# Patient Record
Sex: MALE | Race: BLACK OR AFRICAN AMERICAN | NOT HISPANIC OR LATINO | Employment: FULL TIME | URBAN - METROPOLITAN AREA
[De-identification: names, ages, dates, MRNs, and addresses within clinical notes are randomized per-mention and may not be internally consistent; named-entity substitution may affect disease eponyms.]

---

## 2017-08-18 ENCOUNTER — HOSPITAL ENCOUNTER (OUTPATIENT)
Facility: OTHER | Age: 47
Discharge: HOME | End: 2017-08-19
Attending: HOSPITALIST | Admitting: HOSPITALIST
Payer: MEDICAID

## 2017-08-18 DIAGNOSIS — R79.89 ELEVATED TROPONIN: Primary | ICD-10-CM

## 2017-08-18 DIAGNOSIS — R55 SYNCOPE, UNSPECIFIED SYNCOPE TYPE: ICD-10-CM

## 2017-08-18 DIAGNOSIS — R55 SYNCOPE: ICD-10-CM

## 2017-08-18 DIAGNOSIS — N17.9 ACUTE KIDNEY INJURY: ICD-10-CM

## 2017-08-18 DIAGNOSIS — E86.0 DEHYDRATION, MODERATE: ICD-10-CM

## 2017-08-18 DIAGNOSIS — I10 ESSENTIAL HYPERTENSION: ICD-10-CM

## 2017-08-18 PROBLEM — M62.82 RHABDOMYOLYSIS: Status: ACTIVE | Noted: 2017-08-18

## 2017-08-18 LAB
ALBUMIN SERPL BCP-MCNC: 3.7 G/DL
ALP SERPL-CCNC: 76 U/L
ALT SERPL W/O P-5'-P-CCNC: 18 U/L
ANION GAP SERPL CALC-SCNC: 11 MMOL/L
AST SERPL-CCNC: 25 U/L
BASOPHILS # BLD AUTO: 0.02 K/UL
BASOPHILS NFR BLD: 0.2 %
BILIRUB SERPL-MCNC: 0.7 MG/DL
BILIRUB UR QL STRIP: NEGATIVE
BUN SERPL-MCNC: 28 MG/DL
CALCIUM SERPL-MCNC: 9 MG/DL
CHLORIDE SERPL-SCNC: 104 MMOL/L
CHOLEST/HDLC SERPL: 3.7 {RATIO}
CK SERPL-CCNC: 551 U/L
CLARITY UR: CLEAR
CO2 SERPL-SCNC: 22 MMOL/L
COLOR UR: YELLOW
CREAT SERPL-MCNC: 2.4 MG/DL
DIFFERENTIAL METHOD: ABNORMAL
EOSINOPHIL # BLD AUTO: 0 K/UL
EOSINOPHIL NFR BLD: 0.3 %
ERYTHROCYTE [DISTWIDTH] IN BLOOD BY AUTOMATED COUNT: 13.7 %
EST. GFR  (AFRICAN AMERICAN): 35 ML/MIN/1.73 M^2
EST. GFR  (NON AFRICAN AMERICAN): 31 ML/MIN/1.73 M^2
ESTIMATED AVG GLUCOSE: 80 MG/DL
GLUCOSE SERPL-MCNC: 96 MG/DL
GLUCOSE UR QL STRIP: NEGATIVE
HBA1C MFR BLD HPLC: 4.4 %
HCT VFR BLD AUTO: 39.5 %
HDL/CHOLESTEROL RATIO: 27.3 %
HDLC SERPL-MCNC: 183 MG/DL
HDLC SERPL-MCNC: 50 MG/DL
HGB BLD-MCNC: 12.9 G/DL
HGB UR QL STRIP: ABNORMAL
INR PPP: 0.9
KETONES UR QL STRIP: NEGATIVE
LDLC SERPL CALC-MCNC: 114.6 MG/DL
LEUKOCYTE ESTERASE UR QL STRIP: NEGATIVE
LYMPHOCYTES # BLD AUTO: 2 K/UL
LYMPHOCYTES NFR BLD: 18.2 %
MCH RBC QN AUTO: 31.1 PG
MCHC RBC AUTO-ENTMCNC: 32.7 G/DL
MCV RBC AUTO: 95 FL
MICROSCOPIC COMMENT: ABNORMAL
MONOCYTES # BLD AUTO: 1.2 K/UL
MONOCYTES NFR BLD: 10.9 %
NEUTROPHILS # BLD AUTO: 7.5 K/UL
NEUTROPHILS NFR BLD: 70.1 %
NITRITE UR QL STRIP: NEGATIVE
NONHDLC SERPL-MCNC: 133 MG/DL
PH UR STRIP: 6 [PH] (ref 5–8)
PLATELET # BLD AUTO: 227 K/UL
PMV BLD AUTO: 12.6 FL
POTASSIUM SERPL-SCNC: 4.2 MMOL/L
PROT SERPL-MCNC: 7.4 G/DL
PROT UR QL STRIP: NEGATIVE
PROTHROMBIN TIME: 10.6 SEC
RBC # BLD AUTO: 4.15 M/UL
RBC #/AREA URNS HPF: 6 /HPF (ref 0–4)
SODIUM SERPL-SCNC: 137 MMOL/L
SP GR UR STRIP: 1.02 (ref 1–1.03)
SQUAMOUS #/AREA URNS HPF: 2 /HPF
TRIGL SERPL-MCNC: 92 MG/DL
TROPONIN I SERPL DL<=0.01 NG/ML-MCNC: 0.03 NG/ML
TROPONIN I SERPL DL<=0.01 NG/ML-MCNC: 0.04 NG/ML
TROPONIN I SERPL DL<=0.01 NG/ML-MCNC: 0.05 NG/ML
URN SPEC COLLECT METH UR: ABNORMAL
UROBILINOGEN UR STRIP-ACNC: NEGATIVE EU/DL
WBC # BLD AUTO: 10.72 K/UL

## 2017-08-18 PROCEDURE — G0378 HOSPITAL OBSERVATION PER HR: HCPCS

## 2017-08-18 PROCEDURE — 25000003 PHARM REV CODE 250: Performed by: INTERNAL MEDICINE

## 2017-08-18 PROCEDURE — 99220 PR INITIAL OBSERVATION CARE,LEVL III: CPT | Mod: ,,, | Performed by: HOSPITALIST

## 2017-08-18 PROCEDURE — 82550 ASSAY OF CK (CPK): CPT

## 2017-08-18 PROCEDURE — 84484 ASSAY OF TROPONIN QUANT: CPT | Mod: 91

## 2017-08-18 PROCEDURE — 25000003 PHARM REV CODE 250: Performed by: HOSPITALIST

## 2017-08-18 PROCEDURE — 81000 URINALYSIS NONAUTO W/SCOPE: CPT

## 2017-08-18 PROCEDURE — 36415 COLL VENOUS BLD VENIPUNCTURE: CPT

## 2017-08-18 PROCEDURE — 85025 COMPLETE CBC W/AUTO DIFF WBC: CPT

## 2017-08-18 PROCEDURE — 80061 LIPID PANEL: CPT

## 2017-08-18 PROCEDURE — G0379 DIRECT REFER HOSPITAL OBSERV: HCPCS

## 2017-08-18 PROCEDURE — 83036 HEMOGLOBIN GLYCOSYLATED A1C: CPT

## 2017-08-18 PROCEDURE — 85610 PROTHROMBIN TIME: CPT

## 2017-08-18 PROCEDURE — 80053 COMPREHEN METABOLIC PANEL: CPT

## 2017-08-18 PROCEDURE — 93306 TTE W/DOPPLER COMPLETE: CPT

## 2017-08-18 RX ORDER — LISINOPRIL 20 MG/1
20 TABLET ORAL DAILY
Status: ON HOLD | COMMUNITY
End: 2023-03-29 | Stop reason: SDUPTHER

## 2017-08-18 RX ORDER — NAPROXEN SODIUM 220 MG/1
81 TABLET, FILM COATED ORAL DAILY
Status: DISCONTINUED | OUTPATIENT
Start: 2017-08-18 | End: 2017-08-19 | Stop reason: HOSPADM

## 2017-08-18 RX ORDER — SODIUM CHLORIDE 9 MG/ML
INJECTION, SOLUTION INTRAVENOUS CONTINUOUS
Status: DISCONTINUED | OUTPATIENT
Start: 2017-08-18 | End: 2017-08-19 | Stop reason: HOSPADM

## 2017-08-18 RX ORDER — IBUPROFEN 200 MG
16 TABLET ORAL
Status: DISCONTINUED | OUTPATIENT
Start: 2017-08-18 | End: 2017-08-19 | Stop reason: HOSPADM

## 2017-08-18 RX ORDER — POTASSIUM CHLORIDE 20 MEQ/15ML
60 SOLUTION ORAL ONCE
Status: COMPLETED | OUTPATIENT
Start: 2017-08-18 | End: 2017-08-18

## 2017-08-18 RX ORDER — NIFEDIPINE 30 MG/1
30 TABLET, EXTENDED RELEASE ORAL DAILY
Status: DISCONTINUED | OUTPATIENT
Start: 2017-08-18 | End: 2017-08-19 | Stop reason: HOSPADM

## 2017-08-18 RX ORDER — GLUCAGON 1 MG
1 KIT INJECTION
Status: DISCONTINUED | OUTPATIENT
Start: 2017-08-18 | End: 2017-08-19 | Stop reason: HOSPADM

## 2017-08-18 RX ORDER — ATORVASTATIN CALCIUM 20 MG/1
40 TABLET, FILM COATED ORAL DAILY
Status: DISCONTINUED | OUTPATIENT
Start: 2017-08-18 | End: 2017-08-19 | Stop reason: HOSPADM

## 2017-08-18 RX ORDER — IBUPROFEN 200 MG
24 TABLET ORAL
Status: DISCONTINUED | OUTPATIENT
Start: 2017-08-18 | End: 2017-08-19 | Stop reason: HOSPADM

## 2017-08-18 RX ADMIN — SODIUM CHLORIDE: 0.9 INJECTION, SOLUTION INTRAVENOUS at 12:08

## 2017-08-18 RX ADMIN — POTASSIUM CHLORIDE 60 MEQ: 40 SOLUTION ORAL at 04:08

## 2017-08-18 RX ADMIN — ATORVASTATIN CALCIUM 40 MG: 20 TABLET, FILM COATED ORAL at 08:08

## 2017-08-18 RX ADMIN — SODIUM CHLORIDE: 0.9 INJECTION, SOLUTION INTRAVENOUS at 04:08

## 2017-08-18 RX ADMIN — NIFEDIPINE 30 MG: 30 TABLET, FILM COATED, EXTENDED RELEASE ORAL at 05:08

## 2017-08-18 RX ADMIN — SODIUM CHLORIDE: 0.9 INJECTION, SOLUTION INTRAVENOUS at 08:08

## 2017-08-18 RX ADMIN — ASPIRIN 81 MG CHEWABLE TABLET 81 MG: 81 TABLET CHEWABLE at 08:08

## 2017-08-18 NOTE — PLAN OF CARE
DC Planning:    Writer met with patient at bedside to discuss plan of care. Patient reports living with significant other, no needs expressed. Patient requested info on Pharmacy Assistance program. Writer added info on follow-up section on AVS. CM to follow and remain supportive.      08/18/17 1801   Discharge Assessment   Assessment Type Discharge Planning Assessment   Confirmed/corrected address and phone number on facesheet? Yes   Assessment information obtained from? Patient   Prior to hospitilization cognitive status: Alert/Oriented   Prior to hospitalization functional status: Independent   Current cognitive status: Alert/Oriented   Current Functional Status: Independent   Arrived From admitted as an inpatient   Lives With significant other   Able to Return to Prior Arrangements yes   Is patient able to care for self after discharge? Yes   How many people do you have in your home that can help with your care after discharge? 1   Who are your caregiver(s) and their phone number(s)? Tracee Mari S/O, [phone]   Patient currently being followed by outpatient case management? No   Patient currently receives home health services? No   Does the patient currently use HME? No   Patient currently receives private duty nursing? N/A   Patient currently receives any other outside agency services? No   Equipment Currently Used at Home none   Do you have any problems affording any of your prescribed medications? No   Is the patient taking medications as prescribed? yes   Do you have any financial concerns preventing you from receiving the healthcare you need? Yes  (states Lisinopril is $80 a refill and too expensive)   Does the patient have transportation to healthcare appointments? Yes   Transportation Available car;family or friend will provide   Discharge Plan A Home with family   Patient/Family In Agreement With Plan yes

## 2017-08-18 NOTE — SUBJECTIVE & OBJECTIVE
History reviewed. No pertinent past medical history.    Past Surgical History:   Procedure Laterality Date    HERNIA REPAIR         Review of patient's allergies indicates:  No Known Allergies    No current facility-administered medications on file prior to encounter.      No current outpatient prescriptions on file prior to encounter.     Family History     Problem Relation (Age of Onset)    Hypertension Mother        Social History Main Topics    Smoking status: Current Every Day Smoker     Packs/day: 1.00     Types: Cigarettes     Start date: 8/18/1989    Smokeless tobacco: Never Used    Alcohol use Yes      Comment: occasional    Drug use: No    Sexual activity: Not on file     Review of Systems   Constitutional: Negative for chills and fever.   HENT: Negative for rhinorrhea and sore throat.    Eyes: Negative for photophobia and visual disturbance.   Respiratory: Negative for cough and shortness of breath.    Cardiovascular: Negative for chest pain and palpitations.   Gastrointestinal: Negative for diarrhea and nausea.   Genitourinary: Negative for dysuria and frequency.   Musculoskeletal: Negative for back pain and gait problem.   Neurological: Positive for syncope and light-headedness.   Psychiatric/Behavioral: Negative for confusion and dysphoric mood.     Objective:     Vital Signs (Most Recent):  Temp: 98 °F (36.7 °C) (08/18/17 1500)  Pulse: 69 (08/18/17 1600)  Resp: 18 (08/18/17 0726)  BP: (!) 174/92 (08/18/17 1500)  SpO2: 96 % (08/18/17 1500) Vital Signs (24h Range):  Temp:  [98 °F (36.7 °C)-98.4 °F (36.9 °C)] 98 °F (36.7 °C)  Pulse:  [67-80] 69  Resp:  [16-18] 18  SpO2:  [96 %-98 %] 96 %  BP: (120-174)/(73-92) 174/92     Weight: 72.6 kg (160 lb)  Body mass index is 25.82 kg/m².    Physical Exam   Constitutional: He is oriented to person, place, and time. He appears well-developed and well-nourished.   HENT:   Head: Normocephalic.   Eyes: Conjunctivae are normal. Pupils are equal, round, and  reactive to light.   Neck: Neck supple. No thyromegaly present.   Cardiovascular: Normal rate, regular rhythm, normal heart sounds and intact distal pulses.  Exam reveals no gallop and no friction rub.    No murmur heard.  Pulmonary/Chest: Effort normal and breath sounds normal.   Abdominal: Soft. Bowel sounds are normal. He exhibits no distension. There is no tenderness.   Musculoskeletal: Normal range of motion. He exhibits no edema.   Lymphadenopathy:     He has no cervical adenopathy.   Neurological: He is alert and oriented to person, place, and time.   Strength equal and symmetric   Skin: Skin is warm and dry. No rash noted.   Psychiatric: He has a normal mood and affect. His behavior is normal. Thought content normal.        Significant Labs:   BMP:   Recent Labs  Lab 08/18/17  0425   GLU 96      K 4.2      CO2 22*   BUN 28*   CREATININE 2.4*   CALCIUM 9.0     CBC:   Recent Labs  Lab 08/18/17  0425   WBC 10.72   HGB 12.9*   HCT 39.5*          Significant Imaging: I have reviewed all pertinent imaging results/findings within the past 24 hours.

## 2017-08-18 NOTE — SUBJECTIVE & OBJECTIVE
History reviewed. No pertinent past medical history.    Past Surgical History:   Procedure Laterality Date    HERNIA REPAIR         Review of patient's allergies indicates:  No Known Allergies    No current facility-administered medications on file prior to encounter.      No current outpatient prescriptions on file prior to encounter.     Family History     Problem Relation (Age of Onset)    Hypertension Mother        Social History Main Topics    Smoking status: Current Every Day Smoker     Packs/day: 1.00     Types: Cigarettes     Start date: 8/18/1989    Smokeless tobacco: Never Used    Alcohol use Yes      Comment: occasional    Drug use: No    Sexual activity: Not on file     Review of Systems   Constitutional: Negative for activity change, appetite change and fever.   HENT: Negative.    Eyes: Negative.    Respiratory: Negative for chest tightness, shortness of breath and wheezing.    Cardiovascular: Negative for chest pain, palpitations and leg swelling.   Gastrointestinal: Negative for abdominal distention, abdominal pain, blood in stool, diarrhea and vomiting.   Genitourinary: Negative for dysuria and hematuria.   Neurological: Negative for headaches.   Hematological: Negative for adenopathy.   Psychiatric/Behavioral: Negative for confusion.     Objective:     Vital Signs (Most Recent):  Temp: 98.4 °F (36.9 °C) (08/18/17 0336)  Pulse: 74 (08/18/17 0336)  Resp: 16 (08/18/17 0336)  BP: 133/76 (08/18/17 0336)  SpO2: 97 % (08/18/17 0336) Vital Signs (24h Range):  Temp:  [98.4 °F (36.9 °C)] 98.4 °F (36.9 °C)  Pulse:  [74] 74  Resp:  [16] 16  SpO2:  [97 %] 97 %  BP: (133)/(76) 133/76     Weight: 72.6 kg (160 lb)  Body mass index is 25.82 kg/m².    Physical Exam   Constitutional: He is oriented to person, place, and time. He appears well-developed and well-nourished. No distress.   HENT:   Head: Normocephalic and atraumatic.   Eyes: Pupils are equal, round, and reactive to light.   Neck: Neck supple. No  thyromegaly present.   Cardiovascular: Normal rate and regular rhythm.  Exam reveals no gallop and no friction rub.    No murmur heard.  Pulmonary/Chest: Effort normal and breath sounds normal. No respiratory distress. He has no wheezes.   Abdominal: Soft. Bowel sounds are normal. He exhibits no distension. There is no tenderness. There is no guarding.   Musculoskeletal: Normal range of motion. He exhibits no edema.   Neurological: He is alert and oriented to person, place, and time.   Skin: Skin is warm and dry. No erythema.   Psychiatric: He has a normal mood and affect.        Significant Labs: CBC: No results for input(s): WBC, HGB, HCT, PLT in the last 48 hours.    Significant Imaging: I have reviewed all pertinent imaging results/findings within the past 24 hours.

## 2017-08-18 NOTE — ASSESSMENT & PLAN NOTE
- no chest pain or SOB  - ST depression in anterior leads in [place] but no comparison study  - Trend troponins  - Cardiologist following.

## 2017-08-18 NOTE — ASSESSMENT & PLAN NOTE
- no chest pain or SOB  - ST depression in anterior leads but no comparison study  - given 325 ASA  - cardiology consult  - will trend out troponins

## 2017-08-18 NOTE — CONSULTS
[facility]  Cardiology  Consult Note    Patient Name: Ye Sher  MRN: [id number]  Admission Date: 8/18/2017  Hospital Length of Stay: 0 days  Code Status: Full Code   Attending Provider: Erika Montero MD   Consulting Provider: Arianne Martin MD  Primary Care Physician: Timothy Stephens III, MD  Principal Problem:<principal problem not specified>    Patient information was obtained from patient and ER records.     Consults  Subjective:     Chief Complaint:  Syncope     HPI:   48 yo man who was diagnosed with hypertension in 2015. He has since been on lisinopril. When driving on 8/17/2017 he developed nausea and felt weak. He stopped the car and then vomited. He briefly passed out. He was seen in the ER at [facility]. Transfer[place] [facility] [place].    History reviewed. No pertinent past medical history.    Past Surgical History:   Procedure Laterality Date    HERNIA REPAIR         Review of patient's allergies indicates:  No Known Allergies    No current facility-administered medications on file prior to encounter.      No current outpatient prescriptions on file prior to encounter.     Family History     Problem Relation (Age of Onset)    Hypertension Mother        Social History Main Topics    Smoking status: Current Every Day Smoker     Packs/day: 1.00     Types: Cigarettes     Start date: 8/18/1989    Smokeless tobacco: Never Used    Alcohol use Yes      Comment: occasional    Drug use: No    Sexual activity: Not on file     Review of Systems   Constitution: Negative for chills, fever, weakness and malaise/fatigue.   HENT: Negative for headaches and nosebleeds.    Eyes: Negative for double vision, vision loss in left eye and vision loss in right eye.   Cardiovascular: Positive for syncope. Negative for chest pain, claudication, dyspnea on exertion, irregular heartbeat, leg swelling, near-syncope, orthopnea, palpitations and paroxysmal nocturnal dyspnea.   Respiratory: Negative  for cough, hemoptysis, shortness of breath and wheezing.    Endocrine: Negative for cold intolerance and heat intolerance.   Hematologic/Lymphatic: Negative for bleeding problem. Does not bruise/bleed easily.   Skin: Negative for color change and rash.   Musculoskeletal: Negative for back pain, falls, muscle weakness and myalgias.   Gastrointestinal: Positive for nausea and vomiting. Negative for heartburn, hematemesis, hematochezia, hemorrhoids, jaundice and melena.   Genitourinary: Negative for dysuria and hematuria.   Neurological: Negative for dizziness, focal weakness, light-headedness, loss of balance, numbness and vertigo.   Psychiatric/Behavioral: Negative for altered mental status, depression and memory loss. The patient is not nervous/anxious.    Allergic/Immunologic: Negative for hives and persistent infections.     Objective:     Vital Signs (Most Recent):  Temp: 98 °F (36.7 °C) (08/18/17 1500)  Pulse: 80 (08/18/17 1500)  Resp: 18 (08/18/17 0726)  BP: (!) 174/92 (08/18/17 1500)  SpO2: 96 % (08/18/17 1500) Vital Signs (24h Range):  Temp:  [98 °F (36.7 °C)-98.4 °F (36.9 °C)] 98 °F (36.7 °C)  Pulse:  [67-80] 80  Resp:  [16-18] 18  SpO2:  [96 %-98 %] 96 %  BP: (120-174)/(73-92) 174/92     Weight: 72.6 kg (160 lb)  Body mass index is 25.82 kg/m².    SpO2: 96 %  O2 Device (Oxygen Therapy): room air    No intake or output data in the 24 hours ending 08/18/17 1532    Lines/Drains/Airways          No matching active lines, drains, or airways          Physical Exam   Constitutional: He is oriented to person, place, and time. He appears well-developed and well-nourished.  Non-toxic appearance. No distress.   HENT:   Head: Normocephalic and atraumatic.   Nose: Nose normal.   Eyes: Right eye exhibits no discharge. Left eye exhibits no discharge. Right conjunctiva is not injected. Left conjunctiva is not injected. Right pupil is round. Left pupil is round. Pupils are equal.   Neck: Neck supple. No JVD present. Carotid  bruit is not present. No thyromegaly present.   Cardiovascular: Normal rate, regular rhythm, S1 normal and S2 normal.   No extrasystoles are present. PMI is not displaced.  Exam reveals gallop and S4. Exam reveals no S3.    Pulses:       Radial pulses are 2+ on the right side, and 2+ on the left side.        Femoral pulses are 2+ on the right side, and 2+ on the left side.       Dorsalis pedis pulses are 2+ on the right side, and 2+ on the left side.        Posterior tibial pulses are 2+ on the right side, and 2+ on the left side.   Pulmonary/Chest: Effort normal and breath sounds normal.   Abdominal: Soft. Normal appearance. There is no hepatosplenomegaly. There is no tenderness.   Musculoskeletal:        Right ankle: He exhibits no swelling, no ecchymosis and no deformity.        Left ankle: He exhibits no swelling, no ecchymosis and no deformity.   Lymphadenopathy:        Head (right side): No submandibular adenopathy present.        Head (left side): No submandibular adenopathy present.     He has no cervical adenopathy.   Neurological: He is alert and oriented to person, place, and time. He is not disoriented. No cranial nerve deficit.   Skin: Skin is warm, dry and intact. No rash noted. He is not diaphoretic. No cyanosis. No pallor. Nails show no clubbing.   Psychiatric: He has a normal mood and affect. His speech is normal and behavior is normal. Judgment and thought content normal. Cognition and memory are normal.       Current Medications:     aspirin  81 mg Oral Daily    atorvastatin  40 mg Oral Daily     Current Laboratory Results:    Recent Results (from the past 24 hour(s))   Troponin I    Collection Time: 08/18/17  4:25 AM   Result Value Ref Range    Troponin I 0.038 (H) 0.000 - 0.026 ng/mL   PT/INR    Collection Time: 08/18/17  4:25 AM   Result Value Ref Range    Prothrombin Time 10.6 9.0 - 12.5 sec    INR 0.9 0.8 - 1.2   Hemoglobin A1c    Collection Time: 08/18/17  4:25 AM   Result Value Ref Range     Hemoglobin A1C 4.4 4.0 - 5.6 %    Estimated Avg Glucose 80 68 - 131 mg/dL   Comprehensive Metabolic Panel (CMP)    Collection Time: 08/18/17  4:25 AM   Result Value Ref Range    Sodium 137 136 - 145 mmol/L    Potassium 4.2 3.5 - 5.1 mmol/L    Chloride 104 95 - 110 mmol/L    CO2 22 (L) 23 - 29 mmol/L    Glucose 96 70 - 110 mg/dL    BUN, Bld 28 (H) 6 - 20 mg/dL    Creatinine 2.4 (H) 0.5 - 1.4 mg/dL    Calcium 9.0 8.7 - 10.5 mg/dL    Total Protein 7.4 6.0 - 8.4 g/dL    Albumin 3.7 3.5 - 5.2 g/dL    Total Bilirubin 0.7 0.1 - 1.0 mg/dL    Alkaline Phosphatase 76 55 - 135 U/L    AST 25 10 - 40 U/L    ALT 18 10 - 44 U/L    Anion Gap 11 8 - 16 mmol/L    eGFR if African American 35 (A) >60 mL/min/1.73 m^2    eGFR if non African American 31 (A) >60 mL/min/1.73 m^2   CK    Collection Time: 08/18/17  4:25 AM   Result Value Ref Range     (H) 20 - 200 U/L   CBC with Automated Differential    Collection Time: 08/18/17  4:25 AM   Result Value Ref Range    WBC 10.72 3.90 - 12.70 K/uL    RBC 4.15 (L) 4.60 - 6.20 M/uL    Hemoglobin 12.9 (L) 14.0 - 18.0 g/dL    Hematocrit 39.5 (L) 40.0 - 54.0 %    MCV 95 82 - 98 fL    MCH 31.1 (H) 27.0 - 31.0 pg    MCHC 32.7 32.0 - 36.0 g/dL    RDW 13.7 11.5 - 14.5 %    Platelets 227 150 - 350 K/uL    MPV 12.6 9.2 - 12.9 fL    Gran # 7.5 1.8 - 7.7 K/uL    Lymph # 2.0 1.0 - 4.8 K/uL    Mono # 1.2 (H) 0.3 - 1.0 K/uL    Eos # 0.0 0.0 - 0.5 K/uL    Baso # 0.02 0.00 - 0.20 K/uL    Gran% 70.1 38.0 - 73.0 %    Lymph% 18.2 18.0 - 48.0 %    Mono% 10.9 4.0 - 15.0 %    Eosinophil% 0.3 0.0 - 8.0 %    Basophil% 0.2 0.0 - 1.9 %    Differential Method Automated    Lipid panel    Collection Time: 08/18/17  4:26 AM   Result Value Ref Range    Cholesterol 183 120 - 199 mg/dL    Triglycerides 92 30 - 150 mg/dL    HDL 50 40 - 75 mg/dL    LDL Cholesterol 114.6 63.0 - 159.0 mg/dL    HDL/Chol Ratio 27.3 20.0 - 50.0 %    Total Cholesterol/HDL Ratio 3.7 2.0 - 5.0    Non-HDL Cholesterol 133 mg/dL   Troponin I     Collection Time: 08/18/17 12:46 PM   Result Value Ref Range    Troponin I 0.032 (H) 0.000 - 0.026 ng/mL     Current Imaging Results:    Imaging Results    None       ECG: SR. LVH. Repolarization abnormality.    Assessment and Plan:     1. Syncope   Probably due to neurocardiogenic event with nausea and vomiting.   Suspect subtle troponin rise due to LVH and CKD.   Observe on telemetry.   Do Echo.    2. Hypertension   2015: Diagnosed. Probably longstanding.    3. Chronic Kidney Disease       VTE Risk Mitigation         Ordered     Medium Risk of VTE  Once      08/18/17 0324     Place sequential compression device  Until discontinued      08/18/17 0324     Place RAHUL hose  Until discontinued      08/18/17 0324          Thank you for your consult.    I will follow-up with patient. Please contact us if you have any additional questions.    Arianne Martin MD  Cardiology   [facility]

## 2017-08-18 NOTE — PROGRESS NOTES
[facility] Hospital Medicine  Progress Note    Patient Name: Ye Sher  MRN: [id number]  Patient Class: OP- Observation   Admission Date: 8/18/2017  Length of Stay: 0 days  Attending Physician: Ruben Colón MD  Primary Care Provider: Timothy Stephens III, MD        Subjective:     Principal Problem:<principal problem not specified>    HPI:  47 yea rold male with history of HTN who had a syncopal episode while driving with symptoms proceeding including light-headedness.  He was brought to [facility] and found at that time to have a mildly elevated troponin to 0.6 with ST depression in anterior leads on EKG.  At no time did the patient complain of shortness of breath or chest pain and he remained HD stable.  Of note, he works outside daily and feels his oral intake hasn't been adequate, and CK was >600.  He was transferred here for cardiology evaluation.  No other current complaints.     Hospital Course:  No notes on file    History reviewed. No pertinent past medical history.    Past Surgical History:   Procedure Laterality Date    HERNIA REPAIR         Review of patient's allergies indicates:  No Known Allergies    No current facility-administered medications on file prior to encounter.      No current outpatient prescriptions on file prior to encounter.     Family History     Problem Relation (Age of Onset)    Hypertension Mother        Social History Main Topics    Smoking status: Current Every Day Smoker     Packs/day: 1.00     Types: Cigarettes     Start date: 8/18/1989    Smokeless tobacco: Never Used    Alcohol use Yes      Comment: occasional    Drug use: No    Sexual activity: Not on file     Review of Systems   Constitutional: Negative for activity change, appetite change and fever.   HENT: Negative.    Eyes: Negative.    Respiratory: Negative for chest tightness, shortness of breath and wheezing.    Cardiovascular: Negative for chest pain, palpitations and  leg swelling.   Gastrointestinal: Negative for abdominal distention, abdominal pain, blood in stool, diarrhea and vomiting.   Genitourinary: Negative for dysuria and hematuria.   Neurological: Negative for headaches.   Hematological: Negative for adenopathy.   Psychiatric/Behavioral: Negative for confusion.     Objective:     Vital Signs (Most Recent):  Temp: 98.4 °F (36.9 °C) (08/18/17 0336)  Pulse: 74 (08/18/17 0336)  Resp: 16 (08/18/17 0336)  BP: 133/76 (08/18/17 0336)  SpO2: 97 % (08/18/17 0336) Vital Signs (24h Range):  Temp:  [98.4 °F (36.9 °C)] 98.4 °F (36.9 °C)  Pulse:  [74] 74  Resp:  [16] 16  SpO2:  [97 %] 97 %  BP: (133)/(76) 133/76     Weight: 72.6 kg (160 lb)  Body mass index is 25.82 kg/m².    Physical Exam   Constitutional: He is oriented to person, place, and time. He appears well-developed and well-nourished. No distress.   HENT:   Head: Normocephalic and atraumatic.   Eyes: Pupils are equal, round, and reactive to light.   Neck: Neck supple. No thyromegaly present.   Cardiovascular: Normal rate and regular rhythm.  Exam reveals no gallop and no friction rub.    No murmur heard.  Pulmonary/Chest: Effort normal and breath sounds normal. No respiratory distress. He has no wheezes.   Abdominal: Soft. Bowel sounds are normal. He exhibits no distension. There is no tenderness. There is no guarding.   Musculoskeletal: Normal range of motion. He exhibits no edema.   Neurological: He is alert and oriented to person, place, and time.   Skin: Skin is warm and dry. No erythema.   Psychiatric: He has a normal mood and affect.        Significant Labs: CBC: No results for input(s): WBC, HGB, HCT, PLT in the last 48 hours.    Significant Imaging: I have reviewed all pertinent imaging results/findings within the past 24 hours.    Assessment/Plan:      Essential hypertension    - Hold ACEI  - will monitor          Rhabdomyolysis    - CPK >600  - trend  - NS infusion          Elevated troponin    - no chest pain or  SOB  - ST depression in anterior leads but no comparison study  - given 325 ASA  - cardiology consult  - will trend out troponins          Syncope    - suspect neurocardiogenic given prodrome of symptoms  - possible demand ischemia responsible for elevated troponin?  - given fluids with improvement in symptoms  - continue to monitor  - cardiology consult            VTE Risk Mitigation         Ordered     Medium Risk of VTE  Once      08/18/17 0324     Place sequential compression device  Until discontinued      08/18/17 0324     Place RAHUL hose  Until discontinued      08/18/17 0324              Fred Fraire MD  Department of Hospital Medicine   [facility]

## 2017-08-18 NOTE — HOSPITAL COURSE
Patient was admitted on IV fluids for dehydration and acute kidney injury.  His lisinopril was held for CARY and he was started on nifedipine for blood pressure control.  Creatinine improved to 1.1 the following morning and his lisinopril was resumed.  He was seen by cardiology and thought to have neurocardiogenic syncope.  An outpatient stress test was recommended.  He will follow up with his PCP and with Dr. Martin as outpatient for a nuclear stress test.  Feeling well on discharge and looking forward to going home.

## 2017-08-18 NOTE — PLAN OF CARE
Problem: Patient Care Overview  Goal: Plan of Care Review  Outcome: Ongoing (interventions implemented as appropriate)  Plan of care reviewed with the pt. Pt resting comfortably in bed with no complaints at this time. Pt remains free from falls, injuries and skin breakdown. IV fluids infusing. Telemetry monitor maintained. Purposeful rounding performed. Bed in lowest position. Call light in reach. Will continue to monitor.

## 2017-08-18 NOTE — ASSESSMENT & PLAN NOTE
- CPK >600 on admission consistent with developing rhabdomyolysis, continue IV fluids and monitor for worsening renal function due to rhabdo/dehydration.  - Hold nephrotoxic meds  - Start nifedipine for hypertension, hold lisinopril.

## 2017-08-18 NOTE — ASSESSMENT & PLAN NOTE
- CARY associated with volume depletion  - Unknown baseline as most recent creatinine 1.4 in 2015.  - Continue close monitoring on IV fluids.

## 2017-08-18 NOTE — SUBJECTIVE & OBJECTIVE
History reviewed. No pertinent past medical history.    Past Surgical History:   Procedure Laterality Date    HERNIA REPAIR         Review of patient's allergies indicates:  No Known Allergies    No current facility-administered medications on file prior to encounter.      No current outpatient prescriptions on file prior to encounter.     Family History     Problem Relation (Age of Onset)    Hypertension Mother        Social History Main Topics    Smoking status: Current Every Day Smoker     Packs/day: 1.00     Types: Cigarettes     Start date: 8/18/1989    Smokeless tobacco: Never Used    Alcohol use Yes      Comment: occasional    Drug use: No    Sexual activity: Not on file     Review of Systems   Constitution: Negative for chills, fever, weakness and malaise/fatigue.   HENT: Negative for headaches and nosebleeds.    Eyes: Negative for double vision, vision loss in left eye and vision loss in right eye.   Cardiovascular: Positive for syncope. Negative for chest pain, claudication, dyspnea on exertion, irregular heartbeat, leg swelling, near-syncope, orthopnea, palpitations and paroxysmal nocturnal dyspnea.   Respiratory: Negative for cough, hemoptysis, shortness of breath and wheezing.    Endocrine: Negative for cold intolerance and heat intolerance.   Hematologic/Lymphatic: Negative for bleeding problem. Does not bruise/bleed easily.   Skin: Negative for color change and rash.   Musculoskeletal: Negative for back pain, falls, muscle weakness and myalgias.   Gastrointestinal: Positive for nausea and vomiting. Negative for heartburn, hematemesis, hematochezia, hemorrhoids, jaundice and melena.   Genitourinary: Negative for dysuria and hematuria.   Neurological: Negative for dizziness, focal weakness, light-headedness, loss of balance, numbness and vertigo.   Psychiatric/Behavioral: Negative for altered mental status, depression and memory loss. The patient is not nervous/anxious.    Allergic/Immunologic:  Negative for hives and persistent infections.     Objective:     Vital Signs (Most Recent):  Temp: 98 °F (36.7 °C) (08/18/17 1500)  Pulse: 80 (08/18/17 1500)  Resp: 18 (08/18/17 0726)  BP: (!) 174/92 (08/18/17 1500)  SpO2: 96 % (08/18/17 1500) Vital Signs (24h Range):  Temp:  [98 °F (36.7 °C)-98.4 °F (36.9 °C)] 98 °F (36.7 °C)  Pulse:  [67-80] 80  Resp:  [16-18] 18  SpO2:  [96 %-98 %] 96 %  BP: (120-174)/(73-92) 174/92     Weight: 72.6 kg (160 lb)  Body mass index is 25.82 kg/m².    SpO2: 96 %  O2 Device (Oxygen Therapy): room air    No intake or output data in the 24 hours ending 08/18/17 1532    Lines/Drains/Airways          No matching active lines, drains, or airways          Physical Exam   Constitutional: He is oriented to person, place, and time. He appears well-developed and well-nourished.  Non-toxic appearance. No distress.   HENT:   Head: Normocephalic and atraumatic.   Nose: Nose normal.   Eyes: Right eye exhibits no discharge. Left eye exhibits no discharge. Right conjunctiva is not injected. Left conjunctiva is not injected. Right pupil is round. Left pupil is round. Pupils are equal.   Neck: Neck supple. No JVD present. Carotid bruit is not present. No thyromegaly present.   Cardiovascular: Normal rate, regular rhythm, S1 normal and S2 normal.   No extrasystoles are present. PMI is not displaced.  Exam reveals gallop and S4. Exam reveals no S3.    Pulses:       Radial pulses are 2+ on the right side, and 2+ on the left side.        Femoral pulses are 2+ on the right side, and 2+ on the left side.       Dorsalis pedis pulses are 2+ on the right side, and 2+ on the left side.        Posterior tibial pulses are 2+ on the right side, and 2+ on the left side.   Pulmonary/Chest: Effort normal and breath sounds normal.   Abdominal: Soft. Normal appearance. There is no hepatosplenomegaly. There is no tenderness.   Musculoskeletal:        Right ankle: He exhibits no swelling, no ecchymosis and no deformity.         Left ankle: He exhibits no swelling, no ecchymosis and no deformity.   Lymphadenopathy:        Head (right side): No submandibular adenopathy present.        Head (left side): No submandibular adenopathy present.     He has no cervical adenopathy.   Neurological: He is alert and oriented to person, place, and time. He is not disoriented. No cranial nerve deficit.   Skin: Skin is warm, dry and intact. No rash noted. He is not diaphoretic. No cyanosis. No pallor. Nails show no clubbing.   Psychiatric: He has a normal mood and affect. His speech is normal and behavior is normal. Judgment and thought content normal. Cognition and memory are normal.       Current Medications:     aspirin  81 mg Oral Daily    atorvastatin  40 mg Oral Daily     Current Laboratory Results:    Recent Results (from the past 24 hour(s))   Troponin I    Collection Time: 08/18/17  4:25 AM   Result Value Ref Range    Troponin I 0.038 (H) 0.000 - 0.026 ng/mL   PT/INR    Collection Time: 08/18/17  4:25 AM   Result Value Ref Range    Prothrombin Time 10.6 9.0 - 12.5 sec    INR 0.9 0.8 - 1.2   Hemoglobin A1c    Collection Time: 08/18/17  4:25 AM   Result Value Ref Range    Hemoglobin A1C 4.4 4.0 - 5.6 %    Estimated Avg Glucose 80 68 - 131 mg/dL   Comprehensive Metabolic Panel (CMP)    Collection Time: 08/18/17  4:25 AM   Result Value Ref Range    Sodium 137 136 - 145 mmol/L    Potassium 4.2 3.5 - 5.1 mmol/L    Chloride 104 95 - 110 mmol/L    CO2 22 (L) 23 - 29 mmol/L    Glucose 96 70 - 110 mg/dL    BUN, Bld 28 (H) 6 - 20 mg/dL    Creatinine 2.4 (H) 0.5 - 1.4 mg/dL    Calcium 9.0 8.7 - 10.5 mg/dL    Total Protein 7.4 6.0 - 8.4 g/dL    Albumin 3.7 3.5 - 5.2 g/dL    Total Bilirubin 0.7 0.1 - 1.0 mg/dL    Alkaline Phosphatase 76 55 - 135 U/L    AST 25 10 - 40 U/L    ALT 18 10 - 44 U/L    Anion Gap 11 8 - 16 mmol/L    eGFR if African American 35 (A) >60 mL/min/1.73 m^2    eGFR if non African American 31 (A) >60 mL/min/1.73 m^2   CK    Collection  Time: 08/18/17  4:25 AM   Result Value Ref Range     (H) 20 - 200 U/L   CBC with Automated Differential    Collection Time: 08/18/17  4:25 AM   Result Value Ref Range    WBC 10.72 3.90 - 12.70 K/uL    RBC 4.15 (L) 4.60 - 6.20 M/uL    Hemoglobin 12.9 (L) 14.0 - 18.0 g/dL    Hematocrit 39.5 (L) 40.0 - 54.0 %    MCV 95 82 - 98 fL    MCH 31.1 (H) 27.0 - 31.0 pg    MCHC 32.7 32.0 - 36.0 g/dL    RDW 13.7 11.5 - 14.5 %    Platelets 227 150 - 350 K/uL    MPV 12.6 9.2 - 12.9 fL    Gran # 7.5 1.8 - 7.7 K/uL    Lymph # 2.0 1.0 - 4.8 K/uL    Mono # 1.2 (H) 0.3 - 1.0 K/uL    Eos # 0.0 0.0 - 0.5 K/uL    Baso # 0.02 0.00 - 0.20 K/uL    Gran% 70.1 38.0 - 73.0 %    Lymph% 18.2 18.0 - 48.0 %    Mono% 10.9 4.0 - 15.0 %    Eosinophil% 0.3 0.0 - 8.0 %    Basophil% 0.2 0.0 - 1.9 %    Differential Method Automated    Lipid panel    Collection Time: 08/18/17  4:26 AM   Result Value Ref Range    Cholesterol 183 120 - 199 mg/dL    Triglycerides 92 30 - 150 mg/dL    HDL 50 40 - 75 mg/dL    LDL Cholesterol 114.6 63.0 - 159.0 mg/dL    HDL/Chol Ratio 27.3 20.0 - 50.0 %    Total Cholesterol/HDL Ratio 3.7 2.0 - 5.0    Non-HDL Cholesterol 133 mg/dL   Troponin I    Collection Time: 08/18/17 12:46 PM   Result Value Ref Range    Troponin I 0.032 (H) 0.000 - 0.026 ng/mL     Current Imaging Results:    Imaging Results    None       ECG: SR. LVH. Repolarization abnormality.

## 2017-08-18 NOTE — ASSESSMENT & PLAN NOTE
- suspect neurocardiogenic given prodrome of symptoms  - possible demand ischemia responsible for elevated troponin?  - given fluids with improvement in symptoms  - continue to monitor  - cardiology consult

## 2017-08-18 NOTE — HPI
46 yo man who was diagnosed with hypertension in 2015. He has since been on lisinopril. When driving on 8/17/2017 he developed nausea and felt weak. He stopped the car and then vomited. He briefly passed out. He was seen in the ER at [facility]. Transferred to [facility] for his further.

## 2017-08-18 NOTE — HPI
Mr. Sher is a 47 year old man who works as a .  Yesterday while he was driving he became lightheaded and had to pull over and put his head on the wheel, then became nauseated and vomited.  EMS brought him to [facility] where he was found to have troponin I 0.6 with ST depression in the anterior leads on EKG, and he was transferred here for cardiac workup.  On arrival labs were significant for BUN/creatinine 28/2.4, , troponin 0.038 and mild anemia.  There was a creatinine of 1.4 for comparison from 2015 when he was seen at [facility] for a syncopal episode after a GI illness.    Patient's medical history is significant for hypertension for which he is on lisinopril.  He has had at least 4 previous episodes of syncope or near syncope, always associated with working outside in the heat.  He reports that he keeps water and Gatorade in his truck and tries to remember to drink.

## 2017-08-18 NOTE — H&P
[facility] Hospital Medicine  History & Physical    Patient Name: Ye Sher  MRN: [id number]  Admission Date: 8/18/2017  Attending Physician: Erika Montero MD   Primary Care Provider: Timothy Stephens III, MD         Patient information was obtained from patient, relative(s) and past medical records.     Subjective:     Principal Problem:Syncope    Chief Complaint: No chief complaint on file.       HPI: Mr. Sher is a 47 year old man who works as a .  Yesterday while he was driving he became lightheaded and had to pull over and put his head on the wheel, then became nauseated and vomited.  EMS brought him to [facility] where he was found to have troponin I 0.6 with ST depression in the anterior leads on EKG, and he was transferred here for cardiac workup.  On arrival labs were significant for BUN/creatinine 28/2.4, , troponin 0.038 and mild anemia.  There was a creatinine of 1.4 for comparison from 2015 when he was seen at [facility] for a syncopal episode after a GI illness.    Patient's medical history is significant for hypertension for which he is on lisinopril.  He has had at least 4 previous episodes of syncope or near syncope, always associated with working outside in the heat.  He reports that he keeps water and Gatorade in his truck and tries to remember to drink.    History reviewed. No pertinent past medical history.    Past Surgical History:   Procedure Laterality Date    HERNIA REPAIR         Review of patient's allergies indicates:  No Known Allergies    No current facility-administered medications on file prior to encounter.      No current outpatient prescriptions on file prior to encounter.     Family History     Problem Relation (Age of Onset)    Hypertension Mother        Social History Main Topics    Smoking status: Current Every Day Smoker     Packs/day: 1.00     Types: Cigarettes     Start date: 8/18/1989    Smokeless tobacco: Never  Used    Alcohol use Yes      Comment: occasional    Drug use: No    Sexual activity: Not on file     Review of Systems   Constitutional: Negative for chills and fever.   HENT: Negative for rhinorrhea and sore throat.    Eyes: Negative for photophobia and visual disturbance.   Respiratory: Negative for cough and shortness of breath.    Cardiovascular: Negative for chest pain and palpitations.   Gastrointestinal: Negative for diarrhea and nausea.   Genitourinary: Negative for dysuria and frequency.   Musculoskeletal: Negative for back pain and gait problem.   Neurological: Positive for syncope and light-headedness.   Psychiatric/Behavioral: Negative for confusion and dysphoric mood.     Objective:     Vital Signs (Most Recent):  Temp: 98 °F (36.7 °C) (08/18/17 1500)  Pulse: 69 (08/18/17 1600)  Resp: 18 (08/18/17 0726)  BP: (!) 174/92 (08/18/17 1500)  SpO2: 96 % (08/18/17 1500) Vital Signs (24h Range):  Temp:  [98 °F (36.7 °C)-98.4 °F (36.9 °C)] 98 °F (36.7 °C)  Pulse:  [67-80] 69  Resp:  [16-18] 18  SpO2:  [96 %-98 %] 96 %  BP: (120-174)/(73-92) 174/92     Weight: 72.6 kg (160 lb)  Body mass index is 25.82 kg/m².    Physical Exam   Constitutional: He is oriented to person, place, and time. He appears well-developed and well-nourished.   HENT:   Head: Normocephalic.   Eyes: Conjunctivae are normal. Pupils are equal, round, and reactive to light.   Neck: Neck supple. No thyromegaly present.   Cardiovascular: Normal rate, regular rhythm, normal heart sounds and intact distal pulses.  Exam reveals no gallop and no friction rub.    No murmur heard.  Pulmonary/Chest: Effort normal and breath sounds normal.   Abdominal: Soft. Bowel sounds are normal. He exhibits no distension. There is no tenderness.   Musculoskeletal: Normal range of motion. He exhibits no edema.   Lymphadenopathy:     He has no cervical adenopathy.   Neurological: He is alert and oriented to person, place, and time.   Strength equal and symmetric    Skin: Skin is warm and dry. No rash noted.   Psychiatric: He has a normal mood and affect. His behavior is normal. Thought content normal.        Significant Labs:   BMP:   Recent Labs  Lab 08/18/17 0425   GLU 96      K 4.2      CO2 22*   BUN 28*   CREATININE 2.4*   CALCIUM 9.0     CBC:   Recent Labs  Lab 08/18/17 0425   WBC 10.72   HGB 12.9*   HCT 39.5*          Significant Imaging: I have reviewed all pertinent imaging results/findings within the past 24 hours.    Assessment/Plan:     Dehydration, moderate    - CARY associated with volume depletion  - Unknown baseline as most recent creatinine 1.4 in 2015.  - Continue close monitoring on IV fluids.          Essential hypertension    - Ace held for CARY   - Start nifedipine given BP increasing on IVF.        Acute kidney injury    - CPK >600 on admission consistent with developing rhabdomyolysis, continue IV fluids and monitor for worsening renal function due to rhabdo/dehydration.  - Hold nephrotoxic meds  - Start nifedipine for hypertension, hold lisinopril.          Elevated troponin    - no chest pain or SOB  - ST depression in anterior leads in [place] but no comparison study  - Trend troponins  - Cardiologist following.        Syncope    - Likely neurocardiogenic syncope  - Patient with recurrent episodes associated with volume depletion  - possible demand ischemia responsible for elevated troponin  - 2D echo pending and cardiologist following.          VTE Risk Mitigation         Ordered     Medium Risk of VTE  Once      08/18/17 0324     Place sequential compression device  Until discontinued      08/18/17 0324     Place RAHUL hose  Until discontinued      08/18/17 0324             Erika Savage MD  Department of Hospital Medicine   [facility]

## 2017-08-18 NOTE — ASSESSMENT & PLAN NOTE
- Likely neurocardiogenic syncope  - Patient with recurrent episodes associated with volume depletion  - possible demand ischemia responsible for elevated troponin  - 2D echo pending and cardiologist following.

## 2017-08-19 VITALS
RESPIRATION RATE: 18 BRPM | HEIGHT: 66 IN | OXYGEN SATURATION: 98 % | HEART RATE: 81 BPM | WEIGHT: 160 LBS | TEMPERATURE: 99 F | SYSTOLIC BLOOD PRESSURE: 162 MMHG | BODY MASS INDEX: 25.71 KG/M2 | DIASTOLIC BLOOD PRESSURE: 88 MMHG

## 2017-08-19 LAB
ANION GAP SERPL CALC-SCNC: 7 MMOL/L
BASOPHILS # BLD AUTO: 0.02 K/UL
BASOPHILS NFR BLD: 0.2 %
BUN SERPL-MCNC: 17 MG/DL
CALCIUM SERPL-MCNC: 8.3 MG/DL
CHLORIDE SERPL-SCNC: 109 MMOL/L
CK SERPL-CCNC: 326 U/L
CO2 SERPL-SCNC: 21 MMOL/L
CREAT SERPL-MCNC: 1.1 MG/DL
DIASTOLIC DYSFUNCTION: YES
DIFFERENTIAL METHOD: ABNORMAL
EOSINOPHIL # BLD AUTO: 0.1 K/UL
EOSINOPHIL NFR BLD: 1 %
ERYTHROCYTE [DISTWIDTH] IN BLOOD BY AUTOMATED COUNT: 13.3 %
EST. GFR  (AFRICAN AMERICAN): >60 ML/MIN/1.73 M^2
EST. GFR  (NON AFRICAN AMERICAN): >60 ML/MIN/1.73 M^2
GLUCOSE SERPL-MCNC: 91 MG/DL
HCT VFR BLD AUTO: 37.4 %
HGB BLD-MCNC: 12.3 G/DL
LYMPHOCYTES # BLD AUTO: 2.4 K/UL
LYMPHOCYTES NFR BLD: 26.3 %
MAGNESIUM SERPL-MCNC: 1.7 MG/DL
MCH RBC QN AUTO: 31.4 PG
MCHC RBC AUTO-ENTMCNC: 32.9 G/DL
MCV RBC AUTO: 95 FL
MONOCYTES # BLD AUTO: 0.9 K/UL
MONOCYTES NFR BLD: 9.2 %
NEUTROPHILS # BLD AUTO: 5.8 K/UL
NEUTROPHILS NFR BLD: 63.1 %
PHOSPHATE SERPL-MCNC: 2.5 MG/DL
PLATELET # BLD AUTO: 185 K/UL
PMV BLD AUTO: 12.4 FL
POTASSIUM SERPL-SCNC: 4.2 MMOL/L
RBC # BLD AUTO: 3.92 M/UL
RETIRED EF AND QEF - SEE NOTES: 42 (ref 55–65)
SODIUM SERPL-SCNC: 137 MMOL/L
TROPONIN I SERPL DL<=0.01 NG/ML-MCNC: 0.03 NG/ML
WBC # BLD AUTO: 9.2 K/UL

## 2017-08-19 PROCEDURE — 25000003 PHARM REV CODE 250: Performed by: INTERNAL MEDICINE

## 2017-08-19 PROCEDURE — 85025 COMPLETE CBC W/AUTO DIFF WBC: CPT

## 2017-08-19 PROCEDURE — 80048 BASIC METABOLIC PNL TOTAL CA: CPT

## 2017-08-19 PROCEDURE — 82550 ASSAY OF CK (CPK): CPT

## 2017-08-19 PROCEDURE — 99225 PR SUBSEQUENT OBSERVATION CARE,LEVEL II: CPT | Mod: ,,, | Performed by: HOSPITALIST

## 2017-08-19 PROCEDURE — 25000003 PHARM REV CODE 250: Performed by: HOSPITALIST

## 2017-08-19 PROCEDURE — 84484 ASSAY OF TROPONIN QUANT: CPT

## 2017-08-19 PROCEDURE — 36415 COLL VENOUS BLD VENIPUNCTURE: CPT

## 2017-08-19 PROCEDURE — G0378 HOSPITAL OBSERVATION PER HR: HCPCS

## 2017-08-19 PROCEDURE — 83735 ASSAY OF MAGNESIUM: CPT

## 2017-08-19 PROCEDURE — 84100 ASSAY OF PHOSPHORUS: CPT

## 2017-08-19 RX ORDER — NIFEDIPINE 30 MG/1
30 TABLET, FILM COATED, EXTENDED RELEASE ORAL DAILY
Qty: 30 TABLET | Refills: 3
Start: 2017-08-19 | End: 2017-08-19 | Stop reason: HOSPADM

## 2017-08-19 RX ORDER — LISINOPRIL 20 MG/1
20 TABLET ORAL DAILY
Status: DISCONTINUED | OUTPATIENT
Start: 2017-08-19 | End: 2017-08-19 | Stop reason: HOSPADM

## 2017-08-19 RX ADMIN — LISINOPRIL 20 MG: 20 TABLET ORAL at 02:08

## 2017-08-19 RX ADMIN — ASPIRIN 81 MG CHEWABLE TABLET 81 MG: 81 TABLET CHEWABLE at 08:08

## 2017-08-19 RX ADMIN — SODIUM CHLORIDE: 0.9 INJECTION, SOLUTION INTRAVENOUS at 04:08

## 2017-08-19 RX ADMIN — NIFEDIPINE 30 MG: 30 TABLET, FILM COATED, EXTENDED RELEASE ORAL at 08:08

## 2017-08-19 RX ADMIN — ATORVASTATIN CALCIUM 40 MG: 20 TABLET, FILM COATED ORAL at 08:08

## 2017-08-19 NOTE — DISCHARGE SUMMARY
[facility] Hospital Medicine  Discharge Summary      Patient Name: Ye Sher  MRN: [id number]  Admission Date: 8/18/2017  Hospital Length of Stay: 0 days  Discharge Date and Time:  08/19/2017 2:43 PM  Attending Physician: Erika Montero MD   Discharging Provider: Erika Montero MD  Primary Care Provider: Timothy Stephens III, MD      HPI:   Mr. Sher is a 47 year old man who works as a .  Yesterday while he was driving he became lightheaded and had to pull over and put his head on the wheel, then became nauseated and vomited.  EMS brought him to [facility] where he was found to have troponin I 0.6 with ST depression in the anterior leads on EKG, and he was transferred here for cardiac workup.  On arrival labs were significant for BUN/creatinine 28/2.4, , troponin 0.038 and mild anemia.  There was a creatinine of 1.4 for comparison from 2015 when he was seen at [facility] for a syncopal episode after a GI illness.    Patient's medical history is significant for hypertension for which he is on lisinopril.  He has had at least 4 previous episodes of syncope or near syncope, always associated with working outside in the heat.  He reports that he keeps water and Gatorade in his truck and tries to remember to drink.    Hospital Course:   Patient was admitted on IV fluids for dehydration and acute kidney injury.  His lisinopril was held for CARY and he was started on nifedipine for blood pressure control.  Creatinine improved to 1.1 the following morning and his lisinopril was resumed.  He was seen by cardiology and thought to have neurocardiogenic syncope.  An outpatient stress test was recommended.  He will follow up with his PCP and with Dr. Martin as outpatient for a nuclear stress test.  Feeling well on discharge and looking forward to going home.     Consults:   Consults         Status Ordering Provider     Inpatient consult to Cardiology  Once      Provider:  Arianne Martin MD    Acknowledged KATHARINE GAMING            Final Active Diagnoses:    Diagnosis Date Noted POA    PRINCIPAL PROBLEM:  Syncope [R55] 08/18/2017 Yes    Elevated troponin [R74.8] 08/18/2017 Yes    Acute kidney injury [N17.9] 08/18/2017 Yes    Essential hypertension [I10] 08/18/2017 Yes    Dehydration, moderate [E86.0] 08/18/2017 Yes      Problems Resolved During this Admission:    Diagnosis Date Noted Date Resolved POA      No new Assessment & Plan notes have been filed under this hospital service since the last note was generated.  Service: Hospital Medicine      Discharged Condition: stable    Disposition: Home or Self Care    Follow Up:  Follow-up Information     Call [facility] Patient Pharmacy Assistance.    Why:  for assistance information regarding prescription costs  Contact information:  [phone]           Timothy Stephens III, MD.    Specialty:  Internal Medicine  Why:  Follow up in 1-2 weeks  Contact information:  [address]  [facility] [address]  [phone]             Arianne Martin MD.    Specialty:  Cardiology  Why:  Follow up for the next available appointment for a stress test  Contact information:  [address] [facility] [address]  [phone]                 Patient Instructions:     Diet general     Activity as tolerated       Medications:  Reconciled Home Medications:   Current Discharge Medication List      CONTINUE these medications which have NOT CHANGED    Details   lisinopril (PRINIVIL,ZESTRIL) 20 MG tablet Take 20 mg by mouth once daily.           Time spent on the discharge of patient: <30 minutes    HOS POC IP DISCHARGE SUMMARY    Erika Savage MD  Department of Hospital Medicine  [facility]

## 2017-08-19 NOTE — PROGRESS NOTES
Feels well. 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 When he blacked out, he says he was sitting in his air conditioned car, after a long day in the heat, sweating, when he blacked out.    Vitals:    08/19/17 0737 08/19/17 0800 08/19/17 1000 08/19/17 1202   BP: 133/80   (!) 162/88   BP Location: Left arm      Patient Position: Lying      Pulse: 73 80 69 68   Resp: 18   18   Temp: 98.7 °F (37.1 °C)   98.5 °F (36.9 °C)   TempSrc: Oral   Oral   SpO2: 97%   98%   Weight:       Height:           Chest clear  Cor: no murmur or gallop.    Labs reviewed. The Creat has normalized.    Rec: Resume Lisinopril  OK to discharge on Lisinopril  Will be seen by Dr Martin as OP, have a nuclear stress test performed.

## 2017-08-19 NOTE — NURSING
Discharge instructions and px given and verbalizes understanding.  Hep loc out and catheter intact.  No distress noted and anxious for discharge.

## 2017-09-23 ENCOUNTER — TELEPHONE (OUTPATIENT)
Dept: CARDIOLOGY | Facility: OTHER | Age: 47
End: 2017-09-23

## 2017-09-23 DIAGNOSIS — R79.89 ELEVATED TROPONIN: ICD-10-CM

## 2017-09-23 NOTE — TELEPHONE ENCOUNTER
9/21/2017: Stress MPI: 9:20 min. No CP. ECG LBBB with narrowing with exercise. MPI negative. Mild LV dysfunction.    I discussed the above test result and the implications of the findings over the phone.    Arianne Martin M.D.sfunction.

## 2018-04-06 ENCOUNTER — OFFICE VISIT (OUTPATIENT)
Dept: CARDIOLOGY | Facility: CLINIC | Age: 48
End: 2018-04-06
Payer: MEDICAID

## 2018-04-06 VITALS
DIASTOLIC BLOOD PRESSURE: 73 MMHG | BODY MASS INDEX: 27.21 KG/M2 | WEIGHT: 169.31 LBS | SYSTOLIC BLOOD PRESSURE: 123 MMHG | HEIGHT: 66 IN | HEART RATE: 83 BPM

## 2018-04-06 DIAGNOSIS — I45.6 WPW (WOLFF-PARKINSON-WHITE SYNDROME): ICD-10-CM

## 2018-04-06 DIAGNOSIS — R55 SYNCOPE, UNSPECIFIED SYNCOPE TYPE: ICD-10-CM

## 2018-04-06 PROCEDURE — 99999 PR PBB SHADOW E&M-EST. PATIENT-LVL III: CPT | Mod: PBBFAC,,, | Performed by: INTERNAL MEDICINE

## 2018-04-06 PROCEDURE — 99205 OFFICE O/P NEW HI 60 MIN: CPT | Mod: S$PBB,,, | Performed by: INTERNAL MEDICINE

## 2018-04-06 PROCEDURE — 99213 OFFICE O/P EST LOW 20 MIN: CPT | Mod: PBBFAC,PN | Performed by: INTERNAL MEDICINE

## 2018-04-06 NOTE — LETTER
April 6, 2018      [facility]  [address] [place] [address]           [facility]  [address], [facility] [address]  [place] [address]  Phone: [phone]  Fax: [phone]          Patient: Ye Sher   MR Number: [id number]   YOB: 1970   Date of Visit: 4/6/2018       Dear [facility]:    Thank you for referring Ye Sher to me for evaluation. Attached you will find relevant portions of my assessment and plan of care.    If you have questions, please do not hesitate to call me. I look forward to following Ye Sher along with you.    Sincerely,    Ladarius Kim MD    Enclosure  CC:  No Recipients    If you would like to receive this communication electronically, please contact [email or web address] or [phone] to request more information on [employer] access.    For providers and/or their staff who would like to refer a patient to [facility], please contact us through our one-stop-shop provider referral line, [facility] , at [phone].    If you feel you have received this communication in error or would no longer like to receive these types of communications, please e-mail [email or web address]

## 2018-04-07 NOTE — PROGRESS NOTES
"Subjective:    Patient ID:  Ye Sher is a 47 y.o. y.o. male who presents for follow-up Loss of Consciousness (ED follow up)      HPI     This is a 46 y/o on 3/20/18 reports having an episode tonight in which he felt hot, flushed and weak and then passed out for about 3-5 minutes.  His friends thought he was having a seizure because he twitched a few times when he lost consciousness.  Patient reports he felt nauseated and did have an episode of vomiting.  He has an episode of prior similar episodes periodically and was most recently seen about 6 mos ago for similar episode at which time he was admitted due to abnormal EKG, CARY and presumed dehydration with elevated troponin.  He has since had a stress test and reports he was told it was normal.  The patient reports prior to syncope, [place] and had very little to eat or drink, he finally got out and had 1 alcoholic drink and following ambulating to the bathroom reports the episode described above.         Review of Systems   Constitutional: Negative for malaise/fatigue and weight loss.   Eyes: Negative for blurred vision and double vision.   Respiratory: Negative for shortness of breath.    Cardiovascular: Negative for chest pain, palpitations, orthopnea, claudication, leg swelling and PND.   Gastrointestinal: Negative for abdominal pain, constipation, diarrhea, melena, nausea and vomiting.   Genitourinary: Negative for hematuria.   Musculoskeletal: Negative for myalgias.   Neurological: Negative for dizziness, weakness and headaches.   Endo/Heme/Allergies: Does not bruise/bleed easily.   Psychiatric/Behavioral: Negative for memory loss.        Objective:     /73 (BP Location: Left arm, Patient Position: Sitting, BP Method: Medium (Automatic))   Pulse 83   Ht 5' 6" (1.676 m)   Wt 76.8 kg (169 lb 4.8 oz)   BMI 27.33 kg/m²     Physical Exam   Constitutional: He is oriented to person, place, and time and well-developed, " well-nourished, and in no distress. He appears not jaundiced. No distress.   HENT:   Head: Normocephalic.   Eyes: Pupils are equal, round, and reactive to light. No scleral icterus.   Neck: No hepatojugular reflux and no JVD present. Carotid bruit is not present. No edema present.   Cardiovascular: Normal rate, regular rhythm, S1 normal, S2 normal, normal heart sounds and intact distal pulses.  Exam reveals no gallop, no S3, no S4 and no friction rub.    No murmur heard.  Pulses:       Radial pulses are 2+ on the right side, and 2+ on the left side.        Femoral pulses are 2+ on the right side, and 2+ on the left side.       Dorsalis pedis pulses are 2+ on the right side, and 2+ on the left side.        Posterior tibial pulses are 2+ on the right side, and 2+ on the left side.   Pulmonary/Chest: Breath sounds normal. No respiratory distress. He has no wheezes. He has no rales. He exhibits no tenderness.   Abdominal: Soft. Bowel sounds are normal. He exhibits no distension, no abdominal bruit and no mass. There is no hepatosplenomegaly.   Musculoskeletal: He exhibits no edema.   Neurological: He is alert and oriented to person, place, and time. Gait normal.   Skin: Skin is warm and dry. He is not diaphoretic.   Psychiatric: Affect normal.       Labs:     Lab Results   Component Value Date     (L) 03/20/2018    K 3.8 03/20/2018     03/20/2018    CO2 26 03/20/2018    BUN 23 03/20/2018    CREATININE 1.8 (H) 03/20/2018    ANIONGAP 6 (L) 03/20/2018     Lab Results   Component Value Date    HGBA1C 4.4 08/18/2017     No results found for: BNP, BNPTRIAGEBLO    Lab Results   Component Value Date    WBC 5.80 03/20/2018    HGB 12.0 (L) 03/20/2018    HCT 36.1 (L) 03/20/2018     03/20/2018    GRAN 2.8 03/20/2018    GRAN 48.4 03/20/2018     Lab Results   Component Value Date    CHOL 183 08/18/2017    HDL 50 08/18/2017    LDLCALC 114.6 08/18/2017    TRIG 92 08/18/2017       Meds:     Current Outpatient  Prescriptions:     lisinopril (PRINIVIL,ZESTRIL) 20 MG tablet, Take 20 mg by mouth once daily., Disp: , Rfl:       Assessment & Plan:     Syncope  Refer to EP.   ECG demonstrates WPW.    WPW (Brock-Parkinson-White syndrome)  Refer to EP  Patient refused transfer to [facility] on 3/20 after syncopal episode occurred. He now agrees to outpatient evaluation and treatment.

## 2018-04-07 NOTE — ASSESSMENT & PLAN NOTE
Refer to EP  Patient refused transfer to [facility] on 3/20 after syncopal episode occurred. He now agrees to outpatient evaluation and treatment.

## 2018-05-07 ENCOUNTER — TELEPHONE (OUTPATIENT)
Dept: ELECTROPHYSIOLOGY | Facility: CLINIC | Age: 48
End: 2018-05-07

## 2018-06-06 DIAGNOSIS — I45.6 WPW (WOLFF-PARKINSON-WHITE SYNDROME): Primary | ICD-10-CM

## 2018-06-13 ENCOUNTER — INITIAL CONSULT (OUTPATIENT)
Dept: ELECTROPHYSIOLOGY | Facility: CLINIC | Age: 48
End: 2018-06-13
Payer: MEDICAID

## 2018-06-13 ENCOUNTER — HOSPITAL ENCOUNTER (OUTPATIENT)
Dept: CARDIOLOGY | Facility: CLINIC | Age: 48
Discharge: HOME | End: 2018-06-13
Payer: MEDICAID

## 2018-06-13 VITALS
DIASTOLIC BLOOD PRESSURE: 62 MMHG | HEART RATE: 58 BPM | HEIGHT: 66 IN | WEIGHT: 164.44 LBS | SYSTOLIC BLOOD PRESSURE: 118 MMHG | BODY MASS INDEX: 26.43 KG/M2

## 2018-06-13 DIAGNOSIS — R55 SYNCOPE, UNSPECIFIED SYNCOPE TYPE: ICD-10-CM

## 2018-06-13 DIAGNOSIS — I45.6 WPW (WOLFF-PARKINSON-WHITE SYNDROME): Primary | ICD-10-CM

## 2018-06-13 DIAGNOSIS — I45.6 WPW (WOLFF-PARKINSON-WHITE SYNDROME): ICD-10-CM

## 2018-06-13 DIAGNOSIS — I10 ESSENTIAL HYPERTENSION: ICD-10-CM

## 2018-06-13 PROCEDURE — 93005 ELECTROCARDIOGRAM TRACING: CPT | Mod: PBBFAC | Performed by: INTERNAL MEDICINE

## 2018-06-13 PROCEDURE — 99213 OFFICE O/P EST LOW 20 MIN: CPT | Mod: PBBFAC,25 | Performed by: INTERNAL MEDICINE

## 2018-06-13 PROCEDURE — 93010 ELECTROCARDIOGRAM REPORT: CPT | Mod: S$PBB,,, | Performed by: INTERNAL MEDICINE

## 2018-06-13 PROCEDURE — 99204 OFFICE O/P NEW MOD 45 MIN: CPT | Mod: S$PBB,,, | Performed by: INTERNAL MEDICINE

## 2018-06-13 PROCEDURE — 99999 PR PBB SHADOW E&M-EST. PATIENT-LVL III: CPT | Mod: PBBFAC,,, | Performed by: INTERNAL MEDICINE

## 2018-06-13 NOTE — LETTER
June 13, 2018      Ladarius Kim MD  [address]  [facility] [address]           [facility] - Arrhythmia  [address]  [facility] [address]  Phone: [phone]  Fax: [phone]          Patient: Ye Sher   MR Number: [id number]   YOB: 1970   Date of Visit: 6/13/2018       Dear Dr. Ladarius Kim:    Thank you for referring Ye Sher to me for evaluation. Attached you will find relevant portions of my assessment and plan of care.    If you have questions, please do not hesitate to call me. I look forward to following Ye Sher along with you.    Sincerely,    Danny Quezada MD    Enclosure  CC:  No Recipients    If you would like to receive this communication electronically, please contact [email or web address] or [phone] to request more information on [employer] Link access.    For providers and/or their staff who would like to refer a patient to [facility], please contact us through our one-stop-shop provider referral line, [facility], at [phone].    If you feel you have received this communication in error or would no longer like to receive these types of communications, please e-mail [email or web address]

## 2018-06-13 NOTE — PROGRESS NOTES
"Subjective:    Patient ID:  Ye Sher is a 47 y.o. male who presents for evaluation of Loss of Consciousness      HPI 46 yo male with WPW, syncope, Htn.  Has had recurrent syncope.  Numerous in number ("over 20").  Started in his 20's.  Notes syncope.  Has diaphoresis, then acute of consciousness.  Witnessed to have seizure like activity.  Wakes up, sometimes notes palpitations.  Worse when not hydrated.  For instance, one day, he was at the [employer].  Had not eaten or drank all day.  Was at a meeting, was sitting, and had syncope.    One time he was admitted after an episode and was thought to be dehydrated.  Echo 8/18/17 normal biventricular structure and function.  Exercise spect 9/25/17 nsr.  Negative for ischemia.  Report indicates "loss of LBBB."  Strips reviewed.  There is gradual narrowing of the QRS.  I suspect this is related to enhanced AV lisandro conduction as opposed to loss of AP conduction.  ECG has revealed nsr with pre-excitation.  Suspect right sided AP, possibly anterior.  ECG today without pre-excitation.      Review of Systems   Constitution: Negative. Negative for weakness and malaise/fatigue.   Cardiovascular: Positive for syncope. Negative for chest pain, dyspnea on exertion, irregular heartbeat, leg swelling, near-syncope, orthopnea, palpitations and paroxysmal nocturnal dyspnea.   Respiratory: Negative for cough and shortness of breath.    Neurological: Negative for dizziness and light-headedness.   All other systems reviewed and are negative.       Objective:    Physical Exam   Constitutional: He is oriented to person, place, and time. He appears well-developed and well-nourished.   Eyes: Conjunctivae are normal. No scleral icterus.   Neck: No JVD present. No tracheal deviation present.   Cardiovascular: Normal rate, regular rhythm and normal heart sounds.  PMI is not displaced.    Pulmonary/Chest: Effort normal and breath sounds normal. No respiratory distress.   Abdominal: Soft. " There is no hepatosplenomegaly. There is no tenderness.   Musculoskeletal: He exhibits no edema (lower extremity) or tenderness.   Neurological: He is alert and oriented to person, place, and time.   Skin: Skin is warm and dry. No rash noted.   Psychiatric: He has a normal mood and affect. His behavior is normal.         Assessment:       1. WPW (Brock-Parkinson-White syndrome)    2. Syncope, unspecified syncope type    3. Essential hypertension         Plan:       WPW.  Intermittent pre-excitation.  This means he is at low risk for rapid antegrade conduction of AF, and thus at low risk of sudden death.  He has syncope.  History raises the possibility of dehydration, but SVT is also a possibility.  Offered continuing as is vs EPS/RFA.  He prefers EPS and possible RFA.  Discussed risks and benefits, including higher risk of AV block and PPM if this is anteroseptal.  He would like to proceed.  Anesthesia (prefer MAC).

## 2018-06-15 ENCOUNTER — TELEPHONE (OUTPATIENT)
Dept: ELECTROPHYSIOLOGY | Facility: CLINIC | Age: 48
End: 2018-06-15

## 2018-06-15 DIAGNOSIS — I45.6 WPW (WOLFF-PARKINSON-WHITE SYNDROME): Primary | ICD-10-CM

## 2018-06-15 NOTE — TELEPHONE ENCOUNTER
----- Message from Brittany Franklin sent at 6/15/2018  9:21 AM CDT -----  Contact: pt wife/Everette  Please call pt wife at [phone]. Patient wife has questions regarding future procedure with Dr Quezada    Thank you

## 2018-06-15 NOTE — TELEPHONE ENCOUNTER
Spoke with patient's wife. She wanted to know if he could take his work physical prior to WPW RFA. Advised that they will likely not want to do the physical until after the procedure. She verbalizes understanding.

## 2018-06-25 ENCOUNTER — TELEPHONE (OUTPATIENT)
Dept: ELECTROPHYSIOLOGY | Facility: CLINIC | Age: 48
End: 2018-06-25

## 2018-06-25 NOTE — TELEPHONE ENCOUNTER
ABLATION EDUCATION CHECKLIST    PRE-PROCEDURE TESTIN/13/18 @ 9 AM  Pre-Procedure labs have been ordered for you at:  [facility]  · Be sure to arrive at your scheduled time. YOU DO NOT HAVE TO FAST FOR THIS LAB WORK!    DAY OF PROCEDURE:    18 @ 9 AM  Report to Cardiology Waiting Room on 3rd floor of the Hospital    · Do not eat or drink anything after: 12 mn on the night before your procedure  · Please do not wear makeup (especially mascara) when arriving for your procedure    Medications:   · You may take your other usual morning medications with a sip of water      Directions to the Cardiology Waiting Room  If you park in the Parking Garage:  Take elevators to the 2nd floor  Walk up ramp and turn right by Gold Elevators  Take elevator to the 3rd floor  Upon exiting the elevator, turn away from the clinic areas  Walk long jay around to front of hospital to area with windows overlooking [facility]  Check in at Reception Desk  OR  If family is dropping you off:  Have them drop you off at the front of the Hospital  (Near the ER, where all the flags are hung).  Take the E elevators to the 3rd floor.  Check in at the Reception Desk in the waiting room.        · You will be spending the night after your procedure.  · You will need someone to drive you home the day after your procedure.  · Your pain during your procedure will be managed by the anesthesia team.     Any need to reschedule or cancel procedures, or any questions regarding your procedures should be addressed directly with the Arrhythmia Department Nurses at the following phone number: [phone]

## 2018-07-13 ENCOUNTER — LAB VISIT (OUTPATIENT)
Dept: LAB | Facility: HOSPITAL | Age: 48
End: 2018-07-13
Attending: INTERNAL MEDICINE
Payer: MEDICAID

## 2018-07-13 DIAGNOSIS — I45.6 WPW (WOLFF-PARKINSON-WHITE SYNDROME): ICD-10-CM

## 2018-07-13 LAB
ANION GAP SERPL CALC-SCNC: 6 MMOL/L
APTT BLDCRRT: 27.9 SEC
BASOPHILS # BLD AUTO: 0.03 K/UL
BASOPHILS NFR BLD: 0.5 %
BUN SERPL-MCNC: 24 MG/DL
CALCIUM SERPL-MCNC: 9.5 MG/DL
CHLORIDE SERPL-SCNC: 105 MMOL/L
CO2 SERPL-SCNC: 26 MMOL/L
CREAT SERPL-MCNC: 1.7 MG/DL
DIFFERENTIAL METHOD: ABNORMAL
EOSINOPHIL # BLD AUTO: 0.1 K/UL
EOSINOPHIL NFR BLD: 1.9 %
ERYTHROCYTE [DISTWIDTH] IN BLOOD BY AUTOMATED COUNT: 12.9 %
EST. GFR  (AFRICAN AMERICAN): 53 ML/MIN/1.73 M^2
EST. GFR  (NON AFRICAN AMERICAN): 46 ML/MIN/1.73 M^2
GLUCOSE SERPL-MCNC: 92 MG/DL
HCT VFR BLD AUTO: 34.8 %
HGB BLD-MCNC: 11.6 G/DL
INR PPP: 0.9
LYMPHOCYTES # BLD AUTO: 2.6 K/UL
LYMPHOCYTES NFR BLD: 42.4 %
MCH RBC QN AUTO: 31 PG
MCHC RBC AUTO-ENTMCNC: 33.3 G/DL
MCV RBC AUTO: 93 FL
MONOCYTES # BLD AUTO: 0.5 K/UL
MONOCYTES NFR BLD: 8.5 %
NEUTROPHILS # BLD AUTO: 2.9 K/UL
NEUTROPHILS NFR BLD: 46.5 %
PLATELET # BLD AUTO: 279 K/UL
PMV BLD AUTO: 10.3 FL
POTASSIUM SERPL-SCNC: 5 MMOL/L
PROTHROMBIN TIME: 9.7 SEC
RBC # BLD AUTO: 3.74 M/UL
SODIUM SERPL-SCNC: 137 MMOL/L
WBC # BLD AUTO: 6.21 K/UL

## 2018-07-13 PROCEDURE — 85610 PROTHROMBIN TIME: CPT

## 2018-07-13 PROCEDURE — 80048 BASIC METABOLIC PNL TOTAL CA: CPT

## 2018-07-13 PROCEDURE — 85730 THROMBOPLASTIN TIME PARTIAL: CPT

## 2018-07-13 PROCEDURE — 85025 COMPLETE CBC W/AUTO DIFF WBC: CPT

## 2018-07-13 PROCEDURE — 36415 COLL VENOUS BLD VENIPUNCTURE: CPT

## 2018-07-18 ENCOUNTER — TELEPHONE (OUTPATIENT)
Dept: ELECTROPHYSIOLOGY | Facility: CLINIC | Age: 48
End: 2018-07-18

## 2018-07-18 NOTE — TELEPHONE ENCOUNTER
Spoke to Mr. Sher       CONFIRMED tomorrow's procedure arrival time of 0900  Reiterated instructions including:  -Directions to check in desk  -NPO after midnight    Reviewed lab work (H&H and Creatine) with kathy Johnson to proceed with procedure as planned.     Pt verbalizes understanding of above and appreciates call.

## 2018-07-19 ENCOUNTER — ANESTHESIA (OUTPATIENT)
Dept: MEDSURG UNIT | Facility: HOSPITAL | Age: 48
End: 2018-07-19
Payer: MEDICAID

## 2018-07-19 ENCOUNTER — HOSPITAL ENCOUNTER (OUTPATIENT)
Facility: HOSPITAL | Age: 48
Discharge: HOME | End: 2018-07-20
Attending: INTERNAL MEDICINE | Admitting: INTERNAL MEDICINE
Payer: MEDICAID

## 2018-07-19 ENCOUNTER — SURGERY (OUTPATIENT)
Age: 48
End: 2018-07-19

## 2018-07-19 ENCOUNTER — ANESTHESIA EVENT (OUTPATIENT)
Dept: MEDSURG UNIT | Facility: HOSPITAL | Age: 48
End: 2018-07-19
Payer: MEDICAID

## 2018-07-19 DIAGNOSIS — R55 SYNCOPE AND COLLAPSE: ICD-10-CM

## 2018-07-19 DIAGNOSIS — I49.9 ABNORMAL HEART RHYTHM: ICD-10-CM

## 2018-07-19 DIAGNOSIS — I45.6 WPW (WOLFF-PARKINSON-WHITE SYNDROME): Primary | ICD-10-CM

## 2018-07-19 PROCEDURE — 00537 ANES CARDIAC EP PROCEDURES: CPT | Performed by: INTERNAL MEDICINE

## 2018-07-19 PROCEDURE — D9220A PRA ANESTHESIA: Mod: ANES,,, | Performed by: ANESTHESIOLOGY

## 2018-07-19 PROCEDURE — 93005 ELECTROCARDIOGRAM TRACING: CPT | Mod: 59

## 2018-07-19 PROCEDURE — 93621 COMP EP EVL L PAC&REC C SINS: CPT | Mod: 26,,, | Performed by: INTERNAL MEDICINE

## 2018-07-19 PROCEDURE — 94761 N-INVAS EAR/PLS OXIMETRY MLT: CPT

## 2018-07-19 PROCEDURE — 93613 INTRACARDIAC EPHYS 3D MAPG: CPT | Mod: ,,, | Performed by: INTERNAL MEDICINE

## 2018-07-19 PROCEDURE — 37000008 HC ANESTHESIA 1ST 15 MINUTES: Performed by: INTERNAL MEDICINE

## 2018-07-19 PROCEDURE — 93010 ELECTROCARDIOGRAM REPORT: CPT | Mod: ,,, | Performed by: INTERNAL MEDICINE

## 2018-07-19 PROCEDURE — 93621 COMP EP EVL L PAC&REC C SINS: CPT

## 2018-07-19 PROCEDURE — 93653 COMPRE EP EVAL TX SVT: CPT | Mod: ,,, | Performed by: INTERNAL MEDICINE

## 2018-07-19 PROCEDURE — D9220A PRA ANESTHESIA: Mod: CRNA,,, | Performed by: NURSE ANESTHETIST, CERTIFIED REGISTERED

## 2018-07-19 PROCEDURE — 63600175 PHARM REV CODE 636 W HCPCS: Performed by: NURSE ANESTHETIST, CERTIFIED REGISTERED

## 2018-07-19 PROCEDURE — C1731 CATH, EP, 20 OR MORE ELEC: HCPCS

## 2018-07-19 PROCEDURE — 25000003 PHARM REV CODE 250

## 2018-07-19 PROCEDURE — 25000003 PHARM REV CODE 250: Performed by: NURSE PRACTITIONER

## 2018-07-19 PROCEDURE — 25000003 PHARM REV CODE 250: Performed by: NURSE ANESTHETIST, CERTIFIED REGISTERED

## 2018-07-19 PROCEDURE — 93623 PRGRMD STIMJ&PACG IV RX NFS: CPT | Mod: 26,,, | Performed by: INTERNAL MEDICINE

## 2018-07-19 PROCEDURE — 37000009 HC ANESTHESIA EA ADD 15 MINS: Performed by: INTERNAL MEDICINE

## 2018-07-19 RX ORDER — FENTANYL CITRATE 50 UG/ML
INJECTION, SOLUTION INTRAMUSCULAR; INTRAVENOUS
Status: DISCONTINUED | OUTPATIENT
Start: 2018-07-19 | End: 2018-07-19

## 2018-07-19 RX ORDER — PHENYLEPHRINE HYDROCHLORIDE 10 MG/ML
INJECTION INTRAVENOUS
Status: DISCONTINUED | OUTPATIENT
Start: 2018-07-19 | End: 2018-07-19

## 2018-07-19 RX ORDER — KETAMINE HCL IN 0.9 % NACL 50 MG/5 ML
SYRINGE (ML) INTRAVENOUS
Status: DISCONTINUED | OUTPATIENT
Start: 2018-07-19 | End: 2018-07-19

## 2018-07-19 RX ORDER — FENTANYL CITRATE 50 UG/ML
25 INJECTION, SOLUTION INTRAMUSCULAR; INTRAVENOUS EVERY 5 MIN PRN
Status: DISCONTINUED | OUTPATIENT
Start: 2018-07-19 | End: 2018-07-20 | Stop reason: HOSPADM

## 2018-07-19 RX ORDER — PROPOFOL 10 MG/ML
VIAL (ML) INTRAVENOUS CONTINUOUS PRN
Status: DISCONTINUED | OUTPATIENT
Start: 2018-07-19 | End: 2018-07-19

## 2018-07-19 RX ORDER — MIDAZOLAM HYDROCHLORIDE 1 MG/ML
INJECTION, SOLUTION INTRAMUSCULAR; INTRAVENOUS
Status: DISCONTINUED | OUTPATIENT
Start: 2018-07-19 | End: 2018-07-19

## 2018-07-19 RX ORDER — DIPHENHYDRAMINE HYDROCHLORIDE 50 MG/ML
25 INJECTION INTRAMUSCULAR; INTRAVENOUS EVERY 6 HOURS PRN
Status: DISCONTINUED | OUTPATIENT
Start: 2018-07-19 | End: 2018-07-20 | Stop reason: HOSPADM

## 2018-07-19 RX ORDER — SODIUM CHLORIDE 9 MG/ML
INJECTION, SOLUTION INTRAVENOUS CONTINUOUS
Status: DISCONTINUED | OUTPATIENT
Start: 2018-07-19 | End: 2018-07-20 | Stop reason: HOSPADM

## 2018-07-19 RX ORDER — PROPOFOL 10 MG/ML
VIAL (ML) INTRAVENOUS
Status: DISCONTINUED | OUTPATIENT
Start: 2018-07-19 | End: 2018-07-19

## 2018-07-19 RX ORDER — LISINOPRIL 20 MG/1
20 TABLET ORAL DAILY
Status: DISCONTINUED | OUTPATIENT
Start: 2018-07-20 | End: 2018-07-20 | Stop reason: HOSPADM

## 2018-07-19 RX ADMIN — PHENYLEPHRINE HYDROCHLORIDE 100 MCG: 10 INJECTION INTRAVENOUS at 02:07

## 2018-07-19 RX ADMIN — ISOPROTERENOL HYDROCHLORIDE 2 MCG/MIN: 0.2 INJECTION, SOLUTION INTRAMUSCULAR; INTRAVENOUS at 02:07

## 2018-07-19 RX ADMIN — PROPOFOL 75 MCG/KG/MIN: 10 INJECTION, EMULSION INTRAVENOUS at 01:07

## 2018-07-19 RX ADMIN — FENTANYL CITRATE 25 MCG: 50 INJECTION, SOLUTION INTRAMUSCULAR; INTRAVENOUS at 03:07

## 2018-07-19 RX ADMIN — MIDAZOLAM 2 MG: 1 INJECTION INTRAMUSCULAR; INTRAVENOUS at 01:07

## 2018-07-19 RX ADMIN — Medication 25 MG: at 02:07

## 2018-07-19 RX ADMIN — SODIUM CHLORIDE: 0.9 INJECTION, SOLUTION INTRAVENOUS at 04:07

## 2018-07-19 RX ADMIN — Medication 15 MG: at 03:07

## 2018-07-19 RX ADMIN — Medication 10 MG: at 03:07

## 2018-07-19 RX ADMIN — FENTANYL CITRATE 50 MCG: 50 INJECTION, SOLUTION INTRAMUSCULAR; INTRAVENOUS at 01:07

## 2018-07-19 RX ADMIN — PROPOFOL 30 MG: 10 INJECTION, EMULSION INTRAVENOUS at 01:07

## 2018-07-19 RX ADMIN — SODIUM CHLORIDE: 0.9 INJECTION, SOLUTION INTRAVENOUS at 01:07

## 2018-07-19 NOTE — PROGRESS NOTES
Dr green ep fellow notified pt coughed upon arrival to pacu #16.  Left groin noted with sang drainage.  Pressure held for about 10mins.  Old drsg removed by pacu rn.  Not bleeding noted after pressure held. New drsg placed by pacu rn.  [place]ol[place]/trans film.  md ok with drsg change.  Per ep fellow continue to check groin checks [id number] x 1 hr then continue with orders.  Pt educated to notify rn ahead if he feels cough come on, so rn can hold pressure.  Pt also educated on where to press down over groin sites/drsg if he coughs and rn not at bedside. Verbalizes ok.  Will continue to monitor freq as well.

## 2018-07-19 NOTE — TRANSFER OF CARE
"Anesthesia Transfer of Care Note    Patient: Ye Sher    Procedure(s) Performed: Procedure(s) (LRB):  ABLATION (N/A)    Patient location: PACU    Anesthesia Type: general    Transport from OR: Transported from OR on 6-10 L/min O2 by face mask with adequate spontaneous ventilation    Post pain: adequate analgesia    Post assessment: no apparent anesthetic complications and tolerated procedure well    Post vital signs: stable    Level of consciousness: awake and responds to stimulation    Nausea/Vomiting: no nausea/vomiting    Complications: none    Transfer of care protocol was followed      Last vitals:   Visit Vitals  /62   Pulse 77   Temp 36.5 °C (97.7 °F) (Temporal)   Resp 20   Ht 5' 6" (1.676 m)   Wt 79.4 kg (175 lb)   SpO2 100%   BMI 28.25 kg/m²     "

## 2018-07-19 NOTE — NURSING TRANSFER
Nursing Transfer Note      7/19/2018     Transfer To: pacu 16 to sscu 314.    Transfer via stretcher    Transfer with cardiac monitoring. Tele box on 314    Transported by iman lamb    Medicines sent: none    Chart send with patient: Yes    Notified: spouse    Patient reassessed at: 7/19/18 1815. Next 30 mins checks at 1845.     Upon arrival to floor: cardiac monitor applied, patient oriented to room, call bell in reach and bed in lowest position

## 2018-07-19 NOTE — SUBJECTIVE & OBJECTIVE
Past Medical History:   Diagnosis Date    Hypertension        Past Surgical History:   Procedure Laterality Date    HERNIA REPAIR         Review of patient's allergies indicates:  No Known Allergies    No current facility-administered medications on file prior to encounter.      Current Outpatient Prescriptions on File Prior to Encounter   Medication Sig    lisinopril (PRINIVIL,ZESTRIL) 20 MG tablet Take 20 mg by mouth once daily.     Family History     Problem Relation (Age of Onset)    Hypertension Mother        Social History Main Topics    Smoking status: Current Every Day Smoker     Packs/day: 1.00     Types: Cigarettes     Start date: 8/18/1989    Smokeless tobacco: Never Used    Alcohol use Yes      Comment: occasional    Drug use: No    Sexual activity: Not on file     Review of Systems   All other systems reviewed and are negative.    Objective:     Vital Signs (Most Recent):  Temp: 98.1 °F (36.7 °C) (07/19/18 0846)  Pulse: 69 (07/19/18 0846)  Resp: 18 (07/19/18 0846)  BP: 124/76 (07/19/18 0847)  SpO2: 97 % (07/19/18 0846) Vital Signs (24h Range):  Temp:  [98.1 °F (36.7 °C)] 98.1 °F (36.7 °C)  Pulse:  [69] 69  Resp:  [18] 18  SpO2:  [97 %] 97 %  BP: (124-131)/(76) 124/76       Weight: 79.4 kg (175 lb)  Body mass index is 28.25 kg/m².    SpO2: 97 %  O2 Device (Oxygen Therapy): room air    Physical Exam   Constitutional: He is oriented to person, place, and time. He appears well-developed and well-nourished.   HENT:   Head: Normocephalic and atraumatic.   Eyes: No scleral icterus.   Cardiovascular: Normal rate, regular rhythm and normal heart sounds.    Pulmonary/Chest: Effort normal and breath sounds normal. No respiratory distress. He has no wheezes. He has no rales.   Musculoskeletal: He exhibits no edema.   Neurological: He is alert and oriented to person, place, and time.   Skin: Skin is warm.   Psychiatric: He has a normal mood and affect.       Significant Labs: All pertinent lab results from  the last 24 hours have been reviewed.    Significant Imaging: EKG: As in HPi

## 2018-07-19 NOTE — HPI
"46 yo male with WPW, syncope, Htn. Here for EPS +/- RFA. EKG shows sinus bradycardia with normal axis and normal intervals no preexcitation today.  Has had recurrent syncope.  Numerous in number ("over 20").  Started in his 20's.    Per EP Clinic note.  Echo 8/18/17 normal biventricular structure and function.  Exercise spect 9/25/17 nsr.  Negative for ischemia.  Report indicates "loss of LBBB."  Strips reviewed.  There is gradual narrowing of the QRS.  I suspect this is related to enhanced AV lisandro conduction as opposed to loss of AP conduction.    Suspect right sided AP, possibly anterior.    "

## 2018-07-19 NOTE — BRIEF OP NOTE
"Post EP Procedure Note  Referring Physician: Danny Quezada MD  Procedure: ABLATION (N/A)       Access: Bilateral CFV    Intervention:   Successful ablation of the accessory pathway at the low lateral tricuspid annulus.    Post Cath Exam:   /76 (BP Location: Right arm, Patient Position: Lying)   Pulse 69   Temp 98.1 °F (36.7 °C) (Oral)   Resp 18   Ht 5' 6" (1.676 m)   Wt 79.4 kg (175 lb)   SpO2 97%   BMI 28.25 kg/m²     Recommendations:   - Admit to extended recovery   - Bed rest for 4 hours   - Resume home meds   - Anticipate discharge tomorrow   - B/l groin checks Q 30 min for the next 3 hours   -Tylenol Prn for pain     Ramona Ring DO  Cardiology Fellow PGY-5       "

## 2018-07-19 NOTE — PROGRESS NOTES
Pt's wife updated by pacu rn. Once arrives to hospital later on, to go to new room 314. Verbalizes ok. vss

## 2018-07-19 NOTE — PROGRESS NOTES
"Pt's wife updated over phone by pacu rn.  Verbalizes understanding. She states "she is not at hospital, will be coming later."  pacu rn to re update when pt moves to sscu.  Wife able to talk to pt over phone. vss  "

## 2018-07-19 NOTE — ANESTHESIA PREPROCEDURE EVALUATION
07/19/2018  Ye Sher is a 48 y.o., male.  Patient Active Problem List   Diagnosis    Syncope    Elevated troponin    Acute kidney injury    Essential hypertension    Dehydration, moderate    WPW (Brock-Parkinson-White syndrome)         Anesthesia Evaluation         Review of Systems      Physical Exam  General:  Well nourished    Airway/Jaw/Neck:  Airway Findings: Mouth Opening: Normal Tongue: Normal  General Airway Assessment: Adult  Mallampati: II  Improves to II with phonation.  TM Distance: Normal, at least 6 cm      Dental:  Dental Findings: In tact   Chest/Lungs:  Chest/Lungs Findings: Clear to auscultation     Heart/Vascular:  Heart Findings: Rate: Normal  Rhythm: Regular Rhythm  Sounds: Normal        Mental Status:  Mental Status Findings:  Cooperative, Alert and Oriented         Anesthesia Plan  Type of Anesthesia, risks & benefits discussed:  Anesthesia Type:  general  Patient's Preference: General  Intra-op Monitoring Plan: standard ASA monitors  Intra-op Monitoring Plan Comments: Standard ASA monitors.   Post Op Pain Control Plan: per primary service following discharge from PACU  Post Op Pain Control Plan Comments: Per primary service.     Induction:   IV  Beta Blocker:  Patient is not currently on a Beta-Blocker (No further documentation required).       Informed Consent: Patient understands risks and agrees with Anesthesia plan.  Questions answered. Anesthesia consent signed with patient.  ASA Score: 3     Day of Surgery Review of History & Physical:    H&P update referred to the surgeon.     Anesthesia Plan Notes: Chart reviewed, patient interviewed and examined.  The plan for general anesthesia was explained.  Questions were answered and the consent was signed.  Carmine CARL         Ready For Surgery From Anesthesia Perspective.

## 2018-07-19 NOTE — ASSESSMENT & PLAN NOTE
47 y/o M with multiple syncopal episodes, with WPW on previous EKG. Will proceed with EPS +/- RFA.      1. Access: Bilateral CFV  2. Catheters: RA/RV/His Catheters  3. The patient understands the risks and benefits and wishes to go ahead with the procedure.  4. All patient's questions were answered    Ramona Ring DO  Cardiology Fellow

## 2018-07-19 NOTE — PLAN OF CARE
Vss. nsr noted hr 62. Pt's wife updated by pacu rn. david groins with guaze/trans. Film cdi. No hematoma or drainage noted. Palpable pulses noted. Pt denies pain. Tolerating po intake. See flowsheet for full assessment.  Once release from anesthesia, pt to transfer to sscu 314.  Tele box on [id number].  Will re update pt's wife before transfer.  Pt's wife notified when she returns back to hospital to go to 3rd floor waiting room.    Verbalizes ok.

## 2018-07-19 NOTE — H&P
"[facility]  Cardiac Electrophysiology  History and Physical     Admission Date: 7/19/2018  Code Status: Prior   Attending Provider: Danny Quezada MD   Principal Problem:<principal problem not specified>    Subjective:     Chief Complaint:  WPW/syncope     HPI:  48 yo male with WPW, syncope, Htn. Here for EPS +/- RFA. EKG shows sinus bradycardia with normal axis and normal intervals no preexcitation today.  Has had recurrent syncope.  Numerous in number ("over 20").  Started in his 20's.    Per EP Clinic note.  Echo 8/18/17 normal biventricular structure and function.  Exercise spect 9/25/17 nsr.  Negative for ischemia.  Report indicates "loss of LBBB."  Strips reviewed.  There is gradual narrowing of the QRS.  I suspect this is related to enhanced AV lisandro conduction as opposed to loss of AP conduction.    Suspect right sided AP, possibly anterior.      Past Medical History:   Diagnosis Date    Hypertension        Past Surgical History:   Procedure Laterality Date    HERNIA REPAIR         Review of patient's allergies indicates:  No Known Allergies    No current facility-administered medications on file prior to encounter.      Current Outpatient Prescriptions on File Prior to Encounter   Medication Sig    lisinopril (PRINIVIL,ZESTRIL) 20 MG tablet Take 20 mg by mouth once daily.     Family History     Problem Relation (Age of Onset)    Hypertension Mother        Social History Main Topics    Smoking status: Current Every Day Smoker     Packs/day: 1.00     Types: Cigarettes     Start date: 8/18/1989    Smokeless tobacco: Never Used    Alcohol use Yes      Comment: occasional    Drug use: No    Sexual activity: Not on file     Review of Systems   All other systems reviewed and are negative.    Objective:     Vital Signs (Most Recent):  Temp: 98.1 °F (36.7 °C) (07/19/18 0846)  Pulse: 69 (07/19/18 0846)  Resp: 18 (07/19/18 0846)  BP: 124/76 (07/19/18 0847)  SpO2: 97 % (07/19/18 0846) Vital " Signs (24h Range):  Temp:  [98.1 °F (36.7 °C)] 98.1 °F (36.7 °C)  Pulse:  [69] 69  Resp:  [18] 18  SpO2:  [97 %] 97 %  BP: (124-131)/(76) 124/76       Weight: 79.4 kg (175 lb)  Body mass index is 28.25 kg/m².    SpO2: 97 %  O2 Device (Oxygen Therapy): room air    Physical Exam   Constitutional: He is oriented to person, place, and time. He appears well-developed and well-nourished.   HENT:   Head: Normocephalic and atraumatic.   Eyes: No scleral icterus.   Cardiovascular: Normal rate, regular rhythm and normal heart sounds.    Pulmonary/Chest: Effort normal and breath sounds normal. No respiratory distress. He has no wheezes. He has no rales.   Musculoskeletal: He exhibits no edema.   Neurological: He is alert and oriented to person, place, and time.   Skin: Skin is warm.   Psychiatric: He has a normal mood and affect.       Significant Labs: All pertinent lab results from the last 24 hours have been reviewed.    Significant Imaging: EKG: As in HPi    Assessment and Plan:     WPW (Brock-Parkinson-White syndrome)    49 y/o M with multiple syncopal episodes, with WPW on previous EKG. Will proceed with EPS +/- RFA.      1. Access: Bilateral CFV  2. Catheters: RA/RV/His Catheters  3. The patient understands the risks and benefits and wishes to go ahead with the procedure.  4. All patient's questions were answered    Ramona Ring DO  Cardiology Fellow             Ramona Ring MD  Cardiac Electrophysiology  [facility]

## 2018-07-20 VITALS
BODY MASS INDEX: 28.12 KG/M2 | RESPIRATION RATE: 18 BRPM | SYSTOLIC BLOOD PRESSURE: 128 MMHG | OXYGEN SATURATION: 100 % | WEIGHT: 175 LBS | DIASTOLIC BLOOD PRESSURE: 68 MMHG | TEMPERATURE: 99 F | HEIGHT: 66 IN | HEART RATE: 62 BPM

## 2018-07-20 NOTE — PROGRESS NOTES
Patient received from pacu via stretcher. Pt is aao x 3. Vss. david groin dressings intact with dry gauze/tegaderm. No bleeding no hematoma noted. +2 pedal pulses. Bilateral limbs immobilized. Bedrest protocol until 9pm explained to patient and family at bedside. Frequent vs monitoring continued. Call bell within reach. Bed is low and locked. Tele monitoring on.

## 2018-07-20 NOTE — DISCHARGE SUMMARY
"[facility]  Cardiac Electrophysiology  Discharge Summary      Patient Name: Ye Sher  MRN: [id number]  Admission Date: 7/19/2018  Hospital Length of Stay: 0 days  Discharge Date and Time:  07/20/2018 7:22 AM  Attending Physician: Danny Quezada MD    Discharging Provider: Ramona Ring MD  Primary Care Physician: Timothy Stephens III, MD    HPI:   48 yo male with WPW, syncope, Htn. Here for EPS +/- RFA. EKG shows sinus bradycardia with normal axis and normal intervals no preexcitation today.  Has had recurrent syncope.  Numerous in number ("over 20").  Started in his 20's.    Per EP Clinic note.  Echo 8/18/17 normal biventricular structure and function.  Exercise spect 9/25/17 nsr.  Negative for ischemia.  Report indicates "loss of LBBB."  Strips reviewed.  There is gradual narrowing of the QRS.  I suspect this is related to enhanced AV lisandro conduction as opposed to loss of AP conduction.    Suspect right sided AP, possibly anterior.      Procedure(s) (LRB):  ABLATION (N/A)     Indwelling Lines/Drains at time of discharge:  Lines/Drains/Airways          No matching active lines, drains, or airways          Hospital Course:  No notes on file  Successful ablation of the accessory pathway at the low lateral tricuspid annulus.  Consults:     Significant Diagnostic Studies: Labs: BMP: No results for input(s): GLU, NA, K, CL, CO2, BUN, CREATININE, CALCIUM, MG in the last 48 hours.    Pending Diagnostic Studies:     None          Final Active Diagnoses:    Diagnosis Date Noted POA    WPW (Brock-Parkinson-White syndrome) [I45.6] 04/06/2018 Yes      Problems Resolved During this Admission:    Diagnosis Date Noted Date Resolved POA     No new Assessment & Plan notes have been filed under this hospital service since the last note was generated.  Service: Arrhythmia      Discharged Condition: good    Disposition: Home or Self Care    Follow Up:  Follow-up Information     Danny Quezada MD In 6 weeks.  "   Specialties:  Electrophysiology, Cardiology  Contact information:  [address]  [facility] [address]  [phone]                 Patient Instructions:     Diet Cardiac     Activity as tolerated       Medications:  Reconciled Home Medications:      Medication List      CONTINUE taking these medications    lisinopril 20 MG tablet  Commonly known as:  PRINIVIL,ZESTRIL  Take 20 mg by mouth once daily.            Time spent on the discharge of patient: 20 minutes    Ramona Ring MD  Cardiac Electrophysiology  [facility]

## 2018-07-20 NOTE — CARE UPDATE
Briefly assessed patient, doing well, groin sites examined and no hematoma.     Ramona Ring DO  Cardiology Fellow PGY-5

## 2018-07-20 NOTE — PROGRESS NOTES
Pt is AAOx3 and in no apparent distress.  Bilateral groin sites c/d/i without redness or swelling.  Provided a copy of discharge instructions.  Teaching performed.  Pt verbalized understanding and denied any questions.  PIV d/c catheter tip intact.  2x2 applied and no active bleeding noted.  Pt refused wheelchair and is walking out with family for transport home.

## 2018-07-23 NOTE — ANESTHESIA POSTPROCEDURE EVALUATION
"Anesthesia Post Evaluation    Patient: Ye Sher    Procedure(s) Performed: Procedure(s) (LRB):  ABLATION (N/A)    Final Anesthesia Type: general  Patient location during evaluation: PACU  Patient participation: Yes- Able to Participate  Level of consciousness: awake and alert, oriented and awake  Post-procedure vital signs: reviewed and stable  Pain management: adequate  Airway patency: patent  PONV status at discharge: No PONV  Anesthetic complications: no      Cardiovascular status: blood pressure returned to baseline and hemodynamically stable  Respiratory status: unassisted, spontaneous ventilation and room air  Hydration status: euvolemic  Follow-up not needed.        Visit Vitals  /68 (Patient Position: Lying)   Pulse 62   Temp 37.2 °C (98.9 °F) (Oral)   Resp 18   Ht 5' 6" (1.676 m)   Wt 79.4 kg (175 lb)   SpO2 100%   BMI 28.25 kg/m²       Pain/Selvin Score: No Data Recorded      "

## 2021-06-22 ENCOUNTER — HOSPITAL ENCOUNTER (EMERGENCY)
Facility: HOSPITAL | Age: 51
Discharge: HOME | End: 2021-06-22
Attending: EMERGENCY MEDICINE
Payer: MEDICAID

## 2021-06-22 VITALS
HEART RATE: 74 BPM | RESPIRATION RATE: 15 BRPM | DIASTOLIC BLOOD PRESSURE: 104 MMHG | WEIGHT: 171 LBS | HEIGHT: 63 IN | TEMPERATURE: 98 F | SYSTOLIC BLOOD PRESSURE: 178 MMHG | BODY MASS INDEX: 30.3 KG/M2 | OXYGEN SATURATION: 99 %

## 2021-06-22 DIAGNOSIS — I10 UNCONTROLLED HYPERTENSION: Primary | ICD-10-CM

## 2021-06-22 DIAGNOSIS — S86.912A KNEE STRAIN, LEFT, INITIAL ENCOUNTER: ICD-10-CM

## 2021-06-22 DIAGNOSIS — M25.562 LEFT KNEE PAIN: ICD-10-CM

## 2021-06-22 LAB
ANION GAP SERPL CALC-SCNC: 19 MMOL/L (ref 8–16)
BUN SERPL-MCNC: 14 MG/DL (ref 6–30)
CHLORIDE SERPL-SCNC: 99 MMOL/L (ref 95–110)
CREAT SERPL-MCNC: 1.1 MG/DL (ref 0.5–1.4)
GLUCOSE SERPL-MCNC: 98 MG/DL (ref 70–110)
HCT VFR BLD CALC: 41 %PCV (ref 36–54)
POC IONIZED CALCIUM: 1.23 MMOL/L (ref 1.06–1.42)
POC TCO2 (MEASURED): 26 MMOL/L (ref 23–29)
POTASSIUM BLD-SCNC: 3.7 MMOL/L (ref 3.5–5.1)
SAMPLE: ABNORMAL
SODIUM BLD-SCNC: 139 MMOL/L (ref 136–145)

## 2021-06-22 PROCEDURE — 25000003 PHARM REV CODE 250: Performed by: EMERGENCY MEDICINE

## 2021-06-22 PROCEDURE — 84132 ASSAY OF SERUM POTASSIUM: CPT

## 2021-06-22 PROCEDURE — 99284 EMERGENCY DEPT VISIT MOD MDM: CPT | Mod: 25

## 2021-06-22 PROCEDURE — 82330 ASSAY OF CALCIUM: CPT

## 2021-06-22 PROCEDURE — 84295 ASSAY OF SERUM SODIUM: CPT

## 2021-06-22 PROCEDURE — 82565 ASSAY OF CREATININE: CPT

## 2021-06-22 PROCEDURE — 96374 THER/PROPH/DIAG INJ IV PUSH: CPT

## 2021-06-22 PROCEDURE — 99900035 HC TECH TIME PER 15 MIN (STAT)

## 2021-06-22 PROCEDURE — 85014 HEMATOCRIT: CPT

## 2021-06-22 PROCEDURE — 63600175 PHARM REV CODE 636 W HCPCS: Performed by: EMERGENCY MEDICINE

## 2021-06-22 RX ORDER — CLONIDINE HYDROCHLORIDE 0.1 MG/1
0.1 TABLET ORAL
Status: COMPLETED | OUTPATIENT
Start: 2021-06-22 | End: 2021-06-22

## 2021-06-22 RX ORDER — METOPROLOL SUCCINATE 25 MG/1
25 TABLET, EXTENDED RELEASE ORAL DAILY
Qty: 30 TABLET | Refills: 11 | Status: CANCELLED | OUTPATIENT
Start: 2021-06-22 | End: 2022-06-22

## 2021-06-22 RX ORDER — HYDRALAZINE HYDROCHLORIDE 20 MG/ML
10 INJECTION INTRAMUSCULAR; INTRAVENOUS
Status: COMPLETED | OUTPATIENT
Start: 2021-06-22 | End: 2021-06-22

## 2021-06-22 RX ORDER — PREDNISONE 20 MG/1
40 TABLET ORAL DAILY
Qty: 10 TABLET | Refills: 0 | Status: SHIPPED | OUTPATIENT
Start: 2021-06-22 | End: 2021-06-27

## 2021-06-22 RX ORDER — HYDROCODONE BITARTRATE AND ACETAMINOPHEN 5; 325 MG/1; MG/1
1 TABLET ORAL EVERY 4 HOURS PRN
Qty: 8 TABLET | Refills: 0 | Status: SHIPPED | OUTPATIENT
Start: 2021-06-22 | End: 2021-07-02

## 2021-06-22 RX ORDER — PREDNISONE 20 MG/1
40 TABLET ORAL DAILY
Qty: 10 TABLET | Refills: 0 | Status: CANCELLED | OUTPATIENT
Start: 2021-06-22 | End: 2021-06-27

## 2021-06-22 RX ORDER — AMLODIPINE BESYLATE 10 MG/1
10 TABLET ORAL DAILY
Qty: 30 TABLET | Refills: 3 | Status: ON HOLD | OUTPATIENT
Start: 2021-06-22 | End: 2023-03-29

## 2021-06-22 RX ADMIN — CLONIDINE HYDROCHLORIDE 0.1 MG: 0.1 TABLET ORAL at 10:06

## 2021-06-22 RX ADMIN — HYDRALAZINE HYDROCHLORIDE 10 MG: 20 INJECTION, SOLUTION INTRAMUSCULAR; INTRAVENOUS at 10:06

## 2023-03-28 PROBLEM — K92.2 GI BLEED: Status: ACTIVE | Noted: 2023-03-28

## 2023-03-28 PROBLEM — T39.395A GI BLEED DUE TO NSAIDS: Status: ACTIVE | Noted: 2023-03-28

## 2023-03-28 PROBLEM — K26.9 DUODENAL ULCER: Status: ACTIVE | Noted: 2023-03-28

## 2023-03-28 PROBLEM — Z78.9 ALCOHOL USE: Status: ACTIVE | Noted: 2023-03-28

## 2023-07-03 PROBLEM — T39.395A GI BLEED DUE TO NSAIDS: Status: RESOLVED | Noted: 2023-03-28 | Resolved: 2023-07-03

## 2023-07-03 PROBLEM — K92.2 GI BLEED DUE TO NSAIDS: Status: RESOLVED | Noted: 2023-03-28 | Resolved: 2023-07-03
